# Patient Record
Sex: MALE | Race: WHITE | Employment: OTHER | URBAN - METROPOLITAN AREA
[De-identification: names, ages, dates, MRNs, and addresses within clinical notes are randomized per-mention and may not be internally consistent; named-entity substitution may affect disease eponyms.]

---

## 2017-01-03 ENCOUNTER — OFFICE VISIT (OUTPATIENT)
Dept: INTERNAL MEDICINE CLINIC | Age: 82
End: 2017-01-03

## 2017-01-03 VITALS
WEIGHT: 259 LBS | RESPIRATION RATE: 16 BRPM | DIASTOLIC BLOOD PRESSURE: 80 MMHG | BODY MASS INDEX: 36.26 KG/M2 | HEIGHT: 71 IN | HEART RATE: 74 BPM | SYSTOLIC BLOOD PRESSURE: 130 MMHG

## 2017-01-03 DIAGNOSIS — R31.9 HEMATURIA: Primary | ICD-10-CM

## 2017-01-03 PROCEDURE — 99214 OFFICE O/P EST MOD 30 MIN: CPT | Performed by: NURSE PRACTITIONER

## 2017-01-03 ASSESSMENT — ENCOUNTER SYMPTOMS
DIARRHEA: 0
VOMITING: 0
COUGH: 0
NAUSEA: 0
ABDOMINAL PAIN: 0
RHINORRHEA: 0
SHORTNESS OF BREATH: 0
WHEEZING: 0

## 2017-01-09 ENCOUNTER — HOSPITAL ENCOUNTER (OUTPATIENT)
Dept: CT IMAGING | Age: 82
Discharge: HOME | End: 2017-11-10
Attending: INTERNAL MEDICINE | Admitting: INTERNAL MEDICINE

## 2017-01-09 ENCOUNTER — HOSPITAL ENCOUNTER (OUTPATIENT)
Dept: CT IMAGING | Age: 82
Discharge: HOME | End: 2016-12-31
Attending: INTERNAL MEDICINE | Admitting: INTERNAL MEDICINE

## 2017-01-09 DIAGNOSIS — R31.0 GROSS HEMATURIA: ICD-10-CM

## 2017-01-26 ENCOUNTER — OFFICE VISIT (OUTPATIENT)
Dept: INTERNAL MEDICINE CLINIC | Age: 82
End: 2017-01-26

## 2017-01-26 VITALS
TEMPERATURE: 97.3 F | HEIGHT: 70 IN | DIASTOLIC BLOOD PRESSURE: 80 MMHG | WEIGHT: 258 LBS | HEART RATE: 76 BPM | BODY MASS INDEX: 36.94 KG/M2 | SYSTOLIC BLOOD PRESSURE: 130 MMHG

## 2017-01-26 DIAGNOSIS — Z23 NEED FOR VACCINATION WITH 13-POLYVALENT PNEUMOCOCCAL CONJUGATE VACCINE: ICD-10-CM

## 2017-01-26 DIAGNOSIS — J20.9 ACUTE BRONCHITIS, UNSPECIFIED ORGANISM: Primary | ICD-10-CM

## 2017-01-26 DIAGNOSIS — B36.9 FUNGAL OTITIS EXTERNA: ICD-10-CM

## 2017-01-26 DIAGNOSIS — H62.40 FUNGAL OTITIS EXTERNA: ICD-10-CM

## 2017-01-26 DIAGNOSIS — R31.29 MICROSCOPIC HEMATURIA: ICD-10-CM

## 2017-01-26 PROCEDURE — 99214 OFFICE O/P EST MOD 30 MIN: CPT | Performed by: INTERNAL MEDICINE

## 2017-01-26 PROCEDURE — 90670 PCV13 VACCINE IM: CPT | Performed by: INTERNAL MEDICINE

## 2017-01-26 PROCEDURE — G0009 ADMIN PNEUMOCOCCAL VACCINE: HCPCS | Performed by: INTERNAL MEDICINE

## 2017-01-26 RX ORDER — ACETIC ACID 20.65 MG/ML
4 SOLUTION AURICULAR (OTIC) 3 TIMES DAILY
Qty: 1 BOTTLE | Refills: 0 | Status: SHIPPED | OUTPATIENT
Start: 2017-01-26 | End: 2017-02-02

## 2017-01-26 RX ORDER — AZITHROMYCIN 250 MG/1
TABLET, FILM COATED ORAL
Qty: 1 PACKET | Refills: 0 | Status: SHIPPED | OUTPATIENT
Start: 2017-01-26 | End: 2017-02-05

## 2017-01-26 ASSESSMENT — PATIENT HEALTH QUESTIONNAIRE - PHQ9
1. LITTLE INTEREST OR PLEASURE IN DOING THINGS: 0
2. FEELING DOWN, DEPRESSED OR HOPELESS: 0
SUM OF ALL RESPONSES TO PHQ QUESTIONS 1-9: 0
SUM OF ALL RESPONSES TO PHQ9 QUESTIONS 1 & 2: 0

## 2017-01-26 ASSESSMENT — ENCOUNTER SYMPTOMS
WHEEZING: 0
COUGH: 1
SORE THROAT: 0
RHINORRHEA: 1
SHORTNESS OF BREATH: 0
HEMOPTYSIS: 0

## 2017-02-07 ENCOUNTER — OFFICE VISIT (OUTPATIENT)
Dept: ENT CLINIC | Age: 82
End: 2017-02-07

## 2017-02-07 VITALS
HEIGHT: 70 IN | TEMPERATURE: 97.9 F | SYSTOLIC BLOOD PRESSURE: 120 MMHG | BODY MASS INDEX: 36.84 KG/M2 | DIASTOLIC BLOOD PRESSURE: 74 MMHG | WEIGHT: 257.3 LBS

## 2017-02-07 DIAGNOSIS — H60.8X3 CHRONIC ECZEMATOUS OTITIS EXTERNA OF BOTH EARS: Primary | ICD-10-CM

## 2017-02-07 DIAGNOSIS — H61.23 CERUMINOSIS, BILATERAL: ICD-10-CM

## 2017-02-07 PROCEDURE — 99203 OFFICE O/P NEW LOW 30 MIN: CPT | Performed by: OTOLARYNGOLOGY

## 2017-02-07 RX ORDER — FLUOCINOLONE ACETONIDE 0.11 MG/ML
4 OIL AURICULAR (OTIC) DAILY
Qty: 1 BOTTLE | Refills: 0 | Status: SHIPPED | OUTPATIENT
Start: 2017-02-07 | End: 2017-05-12 | Stop reason: ALTCHOICE

## 2017-03-08 ENCOUNTER — OFFICE VISIT (OUTPATIENT)
Dept: INTERNAL MEDICINE CLINIC | Age: 82
End: 2017-03-08

## 2017-03-08 VITALS
SYSTOLIC BLOOD PRESSURE: 132 MMHG | HEIGHT: 70 IN | WEIGHT: 263 LBS | DIASTOLIC BLOOD PRESSURE: 80 MMHG | HEART RATE: 72 BPM | BODY MASS INDEX: 37.65 KG/M2

## 2017-03-08 DIAGNOSIS — L08.9 INFECTED SEBACEOUS CYST: Primary | ICD-10-CM

## 2017-03-08 DIAGNOSIS — L72.3 INFECTED SEBACEOUS CYST: Primary | ICD-10-CM

## 2017-03-08 PROCEDURE — 99213 OFFICE O/P EST LOW 20 MIN: CPT | Performed by: INTERNAL MEDICINE

## 2017-03-08 RX ORDER — CEPHALEXIN 500 MG/1
500 CAPSULE ORAL 3 TIMES DAILY
Qty: 21 CAPSULE | Refills: 0 | Status: SHIPPED | OUTPATIENT
Start: 2017-03-08 | End: 2017-05-12 | Stop reason: ALTCHOICE

## 2017-03-08 ASSESSMENT — ENCOUNTER SYMPTOMS
ABDOMINAL PAIN: 0
NAUSEA: 0
WHEEZING: 0
BLOOD IN STOOL: 0
VOMITING: 0
DIARRHEA: 0
CHEST TIGHTNESS: 0
COUGH: 0
SHORTNESS OF BREATH: 0

## 2017-04-26 RX ORDER — AZITHROMYCIN 250 MG/1
TABLET, FILM COATED ORAL
Qty: 1 PACKET | Refills: 0 | Status: SHIPPED | OUTPATIENT
Start: 2017-04-26 | End: 2017-05-12 | Stop reason: ALTCHOICE

## 2017-04-29 PROBLEM — I48.91 ATRIAL FIBRILLATION (HCC): Status: ACTIVE | Noted: 2017-04-29

## 2017-05-02 ENCOUNTER — CARE COORDINATION (OUTPATIENT)
Dept: CARE COORDINATION | Age: 82
End: 2017-05-02

## 2017-05-12 ENCOUNTER — OFFICE VISIT (OUTPATIENT)
Dept: CARDIOLOGY CLINIC | Age: 82
End: 2017-05-12

## 2017-05-12 VITALS
SYSTOLIC BLOOD PRESSURE: 112 MMHG | HEIGHT: 70 IN | HEART RATE: 57 BPM | WEIGHT: 256 LBS | DIASTOLIC BLOOD PRESSURE: 76 MMHG | BODY MASS INDEX: 36.65 KG/M2 | OXYGEN SATURATION: 97 %

## 2017-05-12 DIAGNOSIS — I48.91 ATRIAL FIBRILLATION, UNSPECIFIED TYPE (HCC): Primary | ICD-10-CM

## 2017-05-12 PROCEDURE — 99204 OFFICE O/P NEW MOD 45 MIN: CPT | Performed by: INTERNAL MEDICINE

## 2017-06-05 ENCOUNTER — OFFICE VISIT (OUTPATIENT)
Dept: INTERNAL MEDICINE CLINIC | Age: 82
End: 2017-06-05

## 2017-06-05 VITALS
WEIGHT: 259 LBS | BODY MASS INDEX: 36.26 KG/M2 | DIASTOLIC BLOOD PRESSURE: 78 MMHG | HEIGHT: 71 IN | HEART RATE: 72 BPM | SYSTOLIC BLOOD PRESSURE: 124 MMHG

## 2017-06-05 DIAGNOSIS — I48.0 PAROXYSMAL ATRIAL FIBRILLATION (HCC): Primary | ICD-10-CM

## 2017-06-05 DIAGNOSIS — J30.89 PERENNIAL ALLERGIC RHINITIS, UNSPECIFIED ALLERGIC RHINITIS TRIGGER: ICD-10-CM

## 2017-06-05 PROCEDURE — 99213 OFFICE O/P EST LOW 20 MIN: CPT | Performed by: INTERNAL MEDICINE

## 2017-06-05 RX ORDER — MECLIZINE HCL 12.5 MG/1
12.5 TABLET ORAL 3 TIMES DAILY PRN
Qty: 25 TABLET | Refills: 0 | Status: SHIPPED | OUTPATIENT
Start: 2017-06-05 | End: 2018-03-28

## 2017-06-05 ASSESSMENT — ENCOUNTER SYMPTOMS
SHORTNESS OF BREATH: 0
DIARRHEA: 0
WHEEZING: 0
COUGH: 0
CHEST TIGHTNESS: 0
NAUSEA: 0
ABDOMINAL PAIN: 0
BLOOD IN STOOL: 0
VOMITING: 0

## 2017-07-24 ENCOUNTER — OFFICE VISIT (OUTPATIENT)
Dept: INTERNAL MEDICINE CLINIC | Age: 82
End: 2017-07-24

## 2017-07-24 VITALS
BODY MASS INDEX: 37.51 KG/M2 | WEIGHT: 262 LBS | HEART RATE: 68 BPM | TEMPERATURE: 97.6 F | SYSTOLIC BLOOD PRESSURE: 122 MMHG | HEIGHT: 70 IN | DIASTOLIC BLOOD PRESSURE: 78 MMHG

## 2017-07-24 DIAGNOSIS — E55.9 VITAMIN D DEFICIENCY: ICD-10-CM

## 2017-07-24 DIAGNOSIS — R53.83 FATIGUE, UNSPECIFIED TYPE: ICD-10-CM

## 2017-07-24 DIAGNOSIS — J30.2 SEASONAL ALLERGIC RHINITIS, UNSPECIFIED ALLERGIC RHINITIS TRIGGER: ICD-10-CM

## 2017-07-24 DIAGNOSIS — R05.9 COUGH: Primary | ICD-10-CM

## 2017-07-24 DIAGNOSIS — R42 VERTIGO: ICD-10-CM

## 2017-07-24 PROCEDURE — 99213 OFFICE O/P EST LOW 20 MIN: CPT | Performed by: INTERNAL MEDICINE

## 2017-07-24 ASSESSMENT — ENCOUNTER SYMPTOMS
SHORTNESS OF BREATH: 0
NAUSEA: 0
BLOOD IN STOOL: 0
CHEST TIGHTNESS: 0
ABDOMINAL PAIN: 0
WHEEZING: 0
COUGH: 1
DIARRHEA: 0
VOMITING: 0

## 2017-10-18 ENCOUNTER — OFFICE VISIT (OUTPATIENT)
Dept: INTERNAL MEDICINE CLINIC | Age: 82
End: 2017-10-18

## 2017-10-18 VITALS
HEIGHT: 71 IN | DIASTOLIC BLOOD PRESSURE: 78 MMHG | TEMPERATURE: 97.8 F | HEART RATE: 76 BPM | SYSTOLIC BLOOD PRESSURE: 122 MMHG | BODY MASS INDEX: 36.4 KG/M2 | WEIGHT: 260 LBS

## 2017-10-18 DIAGNOSIS — Z23 NEED FOR INFLUENZA VACCINATION: ICD-10-CM

## 2017-10-18 DIAGNOSIS — J06.9 VIRAL URI: Primary | ICD-10-CM

## 2017-10-18 PROCEDURE — 99213 OFFICE O/P EST LOW 20 MIN: CPT | Performed by: INTERNAL MEDICINE

## 2017-10-18 PROCEDURE — 90662 IIV NO PRSV INCREASED AG IM: CPT | Performed by: INTERNAL MEDICINE

## 2017-10-18 PROCEDURE — G0008 ADMIN INFLUENZA VIRUS VAC: HCPCS | Performed by: INTERNAL MEDICINE

## 2017-10-18 RX ORDER — BETAMETHASONE DIPROPIONATE 0.5 MG/G
LOTION TOPICAL
Qty: 1 BOTTLE | Refills: 2 | Status: SHIPPED | OUTPATIENT
Start: 2017-10-18 | End: 2018-03-28

## 2017-10-18 ASSESSMENT — ENCOUNTER SYMPTOMS
SHORTNESS OF BREATH: 0
ABDOMINAL PAIN: 0
CHEST TIGHTNESS: 0
VOMITING: 0
COUGH: 1
RHINORRHEA: 1
DIARRHEA: 0
WHEEZING: 0
BLOOD IN STOOL: 0
NAUSEA: 0

## 2017-10-18 NOTE — PROGRESS NOTES
Subjective:      Patient ID: Vineet Saunders is a 80 y.o. male. HPI  C/o cough- few days. Non productive. Nasal congestion,pnd +. No sinus pressure. No fever. Review of Systems   Constitutional: Negative. Negative for fatigue and fever. HENT: Positive for postnasal drip and rhinorrhea. Respiratory: Positive for cough. Negative for chest tightness, shortness of breath and wheezing. Cardiovascular: Negative for chest pain and palpitations. Gastrointestinal: Negative for abdominal pain, blood in stool, diarrhea, nausea and vomiting. Objective:   Physical Exam   Constitutional: He appears well-developed and well-nourished. HENT:   Head: Normocephalic and atraumatic. Right Ear: External ear normal.   Left Ear: External ear normal.   Nose: Nose normal.   Mouth/Throat: Oropharynx is clear and moist. No oropharyngeal exudate. TM- normal.   Eyes: Pupils are equal, round, and reactive to light. Neck: Normal range of motion. Neck supple. Cardiovascular: Normal rate, regular rhythm, normal heart sounds and intact distal pulses. Exam reveals no gallop and no friction rub. No murmur heard. Pulmonary/Chest: Effort normal and breath sounds normal. No respiratory distress. He has no wheezes. He has no rales. He exhibits no tenderness. Lymphadenopathy:     He has no cervical adenopathy. Assessment:      1. Viral URI     2. Need for influenza vaccination             Plan:      Mucinex. Flonase.   claritin

## 2017-10-19 ENCOUNTER — TELEPHONE (OUTPATIENT)
Dept: INTERNAL MEDICINE CLINIC | Age: 82
End: 2017-10-19

## 2017-10-19 NOTE — TELEPHONE ENCOUNTER
----- Message from Christine Johnson MD sent at 10/19/2017  4:39 PM EDT -----  Contact: pt daughter in law # [phone]  Take tylenol for the fever. TAke mucinex.  ----- Message -----  From: Joana Cook  Sent: 10/19/2017   4:10 PM  To: Christine Johnson MD    Pt daughter in law called and said pt is running a fever today and has a bad wheeze.  Wanted to know if you could call pt in medication or if you would like to see him again    Last Visit: 10/18/17  Future Visit:----    Pharmacy: [facility] [address] [facility], [address] [place] [address] [place] [phone]

## 2017-12-08 ENCOUNTER — OFFICE VISIT (OUTPATIENT)
Dept: CARDIOLOGY CLINIC | Age: 82
End: 2017-12-08

## 2017-12-08 VITALS
OXYGEN SATURATION: 98 % | HEART RATE: 72 BPM | SYSTOLIC BLOOD PRESSURE: 110 MMHG | WEIGHT: 259 LBS | HEIGHT: 71 IN | DIASTOLIC BLOOD PRESSURE: 80 MMHG | BODY MASS INDEX: 36.26 KG/M2

## 2017-12-08 DIAGNOSIS — I48.0 PAROXYSMAL ATRIAL FIBRILLATION (HCC): Primary | ICD-10-CM

## 2017-12-08 PROCEDURE — 99213 OFFICE O/P EST LOW 20 MIN: CPT | Performed by: INTERNAL MEDICINE

## 2017-12-08 NOTE — LETTER
[facility] Cardiology - [address] [facility] [facility] [address] [address] [place] [address]  Phone: [phone]  Fax: [phone]    Ashlee Carrasquillo MD        2017     Wilda Sarmiento MD  [address], [facility] [place] [address]    Patient: Salinas Gamboa  MR Number: [id number]  YOB: 1927  Date of Visit: 2017    Dear Dr. Wilda Sarmiento:    Thank you for allowing me to participate in the care of Salinas Gamboa. Below are the relevant portions of my assessment and plan of care. [address] [facility]   Cardiovascular Evaluation    PATIENT: Salinas Gamboa  DATE: 2017  MRN: [id number]  CSN: [id number]  : 1927    Primary Care Doctor: Wilda Sarmiento MD    Reason for evaluation:   Atrial Fibrillation (c/o left arm pain) and Shortness of Breath (exertional)    Subjective:     History of present illness on initial date of evaluation:   Salinas Gamboa is a 80 y.o. patient who presents for hospital follow up. Today he reports he has been struggling with a lack of energy since the summer. He checks his blood pressure at home and it is stable. He has had no changes in his medications. He admits to occasional dizziness. His activity is limited due to his leg pain and fatigue. Patient Active Problem List   Diagnosis    Arthritis    Peptic ulcer disease    Total knee replacement status    Arrhythmia    Chest pain    Bradycardia    GERD (gastroesophageal reflux disease)    Contusion, hip    Primary osteoarthritis involving multiple joints    Atrial fibrillation [facility])       Past Medical History:   has a past medical history of Arthritis; Miller's esophagus; Cancer ([facility] [address] Diarrhea; GERD (gastroesophageal reflux disease);  Internal hemorrhoids without mention of complication; Osteoarthritis; Peptic ulcer disease; Peptic ulcer, unspecified site, unspecified as acute or chronic, without mention of hemorrhage or perforation; Reflux; Senile cataract; Sinusitis, acute; and Total knee replacement status. Surgical History:   has a past surgical history that includes joint replacement (2008); Tonsillectomy; ostate surgery; eye surgery; Total knee arthroplasty (2/1/2011); fracture surgery; Endoscopy, colon, diagnostic; Colonoscopy; Umbilical hernia repair (12/4/14); Inguinal hernia repair (Left, 12/4/14); and other surgical history (01/20/2016). Social History:   reports that he quit smoking about 42 years ago. He has a 40.00 pack-year smoking history. He quit smokeless tobacco use about 40 years ago. He reports that he drinks about 0.6 oz of alcohol per week . He reports that he does not use drugs. Family History:  No evidence for sudden cardiac death or premature CAD    Medications:  Reviewed and are listed in nursing record. and/or listed below  Outpatient Medications:  Prior to Admission medications    Medication Sig Start Date End Date Taking? Authorizing Provider   betamethasone dipropionate 0.05 % lotion Apply topically 2 times daily. 10/18/17  Yes Althea Jessica MD   meclizine (ANTIVERT) 12.5 MG tablet Take 1 tablet by mouth 3 times daily as needed for Dizziness 6/5/17  Yes Althea Jessica MD   loratadine (CLARITIN) 10 MG tablet Take 1 tablet by mouth daily 10/11/16  Yes Shakira Bonilla PA-C   fluticasone (FLONASE) 50 MCG/ACT nasal spray 2 sprays by Nasal route daily 8/25/16  Yes Althea Jessica MD   omeprazole (PRILOSEC) 20 MG capsule Take 1 capsule by mouth daily. 4/13/11  Yes Althea Jessica MD       In-patient schedule medications:        Infusion Medications: Allergies:  Review of patient's allergies indicates no known allergies. Review of Systems:   All 14 point review of symptoms completed. Pertinent positives identified in the HPI, all other review of symptoms negative as below. Review of Systems - History obtained from the patient  General ROS: negative for - chills, fever or night sweats  Psychological ROS: negative for - disorientation or hallucinations  Ophthalmic ROS: negative for - dry eyes, eye pain or loss of vision  ENT ROS: negative for - nasal discharge or sore throat  Allergy and Immunology ROS: negative for - hives or itchy/watery eyes  Hematological and Lymphatic ROS: negative for - jaundice or night sweats  Endocrine ROS: negative for - mood swings or temperature intolerance  Breast ROS: deferred  Respiratory ROS: negative for - hemoptysis or stridor  Cardiovascular ROS: negative for - chest pain, dyspnea on exertion or palpitations  Gastrointestinal ROS: no abdominal pain, change in bowel habits, or black or bloody stools  Genito-Urinary ROS: no dysuria, trouble voiding, or hematuria  Musculoskeletal ROS: negative for - gait disturbance, joint pain or joint stiffness  Neurological ROS: negative for - seizures or speech problems  Dermatological ROS: negative for - rash or skin lesion changes     Physical Examination:    Vitals:    12/08/17 1520   BP: 92/70   Pulse: 72   SpO2: 98%    Weight: 259 lb (117.5 kg)     Wt Readings from Last 3 Encounters:   12/08/17 259 lb (117.5 kg)   10/18/17 260 lb (117.9 kg)   07/24/17 262 lb (118.8 kg)     No intake or output data in the 24 hours ending 12/08/17 1542    General Appearance:  Alert, cooperative, no distress, appears stated age   Head:  Normocephalic, without obvious abnormality, atraumatic   Eyes:  PERRL, conjunctiva/corneas clear       Nose: Nares normal, no drainage or sinus tenderness   Throat: Lips, mucosa, and tongue normal   Neck: Supple, symmetrical, trachea midline, no adenopathy, thyroid: not enlarged, symmetric, no tenderness/mass/nodules, no carotid bruit or JVD       Lungs:   Clear to auscultation bilaterally, respirations unlabored   Chest Wall:  No tenderness or deformity

## 2017-12-08 NOTE — PATIENT INSTRUCTIONS
Plan:  1. Continue to check blood pressure. 2. Remain as active and independent as possible. 3. Follow up with me in 6 months.

## 2017-12-08 NOTE — PROGRESS NOTES
He quit smokeless tobacco use about 40 years ago. He reports that he drinks about 0.6 oz of alcohol per week . He reports that he does not use drugs. Family History:  No evidence for sudden cardiac death or premature CAD    Medications:  Reviewed and are listed in nursing record. and/or listed below  Outpatient Medications:  Prior to Admission medications    Medication Sig Start Date End Date Taking? Authorizing Provider   betamethasone dipropionate 0.05 % lotion Apply topically 2 times daily. 10/18/17  Yes Catie Correia MD   meclizine (ANTIVERT) 12.5 MG tablet Take 1 tablet by mouth 3 times daily as needed for Dizziness 6/5/17  Yes Catie Correia MD   loratadine (CLARITIN) 10 MG tablet Take 1 tablet by mouth daily 10/11/16  Yes Shakira Bonilla PA-C   fluticasone (FLONASE) 50 MCG/ACT nasal spray 2 sprays by Nasal route daily 8/25/16  Yes Catie Correia MD   omeprazole (PRILOSEC) 20 MG capsule Take 1 capsule by mouth daily. 4/13/11  Yes Catie Correia MD       In-patient schedule medications:        Infusion Medications: Allergies:  Review of patient's allergies indicates no known allergies. Review of Systems:   All 14 point review of symptoms completed. Pertinent positives identified in the HPI, all other review of symptoms negative as below.     Review of Systems - History obtained from the patient  General ROS: negative for - chills, fever or night sweats  Psychological ROS: negative for - disorientation or hallucinations  Ophthalmic ROS: negative for - dry eyes, eye pain or loss of vision  ENT ROS: negative for - nasal discharge or sore throat  Allergy and Immunology ROS: negative for - hives or itchy/watery eyes  Hematological and Lymphatic ROS: negative for - jaundice or night sweats  Endocrine ROS: negative for - mood swings or temperature intolerance  Breast ROS: deferred  Respiratory ROS: negative for - hemoptysis or stridor  Cardiovascular ROS:

## 2017-12-11 NOTE — COMMUNICATION BODY
[address] [facility]   Cardiovascular Evaluation    PATIENT: Alessandra Carranza  DATE: 2017  MRN: [id number]  CSN: [id number]  : 1927    Primary Care Doctor: Chapo Kamara MD    Reason for evaluation:   Atrial Fibrillation (c/o left arm pain) and Shortness of Breath (exertional)    Subjective:     History of present illness on initial date of evaluation:   Alessandra Carranza is a 80 y.o. patient who presents for hospital follow up. Today he reports he has been struggling with a lack of energy since the summer. He checks his blood pressure at home and it is stable. He has had no changes in his medications. He admits to occasional dizziness. His activity is limited due to his leg pain and fatigue. Patient Active Problem List   Diagnosis    Arthritis    Peptic ulcer disease    Total knee replacement status    Arrhythmia    Chest pain    Bradycardia    GERD (gastroesophageal reflux disease)    Contusion, hip    Primary osteoarthritis involving multiple joints    Atrial fibrillation [facility])       Past Medical History:   has a past medical history of Arthritis; Miller's esophagus; Cancer ([facility] [address] Diarrhea; GERD (gastroesophageal reflux disease); Internal hemorrhoids without mention of complication; Osteoarthritis; Peptic ulcer disease; Peptic ulcer, unspecified site, unspecified as acute or chronic, without mention of hemorrhage or perforation; Reflux; Senile cataract; Sinusitis, acute; and Total knee replacement status. Surgical History:   has a past surgical history that includes joint replacement (); Tonsillectomy; ostate surgery; eye surgery; Total knee arthroplasty (2011); fracture surgery; Endoscopy, colon, diagnostic; Colonoscopy; Umbilical hernia repair (14); Inguinal hernia repair (Left, 14); and other surgical history (2016). Social History:   reports that he quit smoking about 42 years ago. He has a 40.00 pack-year smoking history.

## 2018-03-28 ENCOUNTER — HOSPITAL ENCOUNTER (OUTPATIENT)
Dept: OTHER | Age: 83
Discharge: HOME | End: 2018-03-28
Attending: INTERNAL MEDICINE | Admitting: INTERNAL MEDICINE

## 2018-03-28 DIAGNOSIS — R29.6 RECURRENT FALLS: ICD-10-CM

## 2018-04-11 ENCOUNTER — TELEPHONE (OUTPATIENT)
Dept: INTERNAL MEDICINE CLINIC | Age: 83
End: 2018-04-11

## 2018-04-19 ENCOUNTER — OFFICE VISIT (OUTPATIENT)
Dept: INTERNAL MEDICINE CLINIC | Age: 83
End: 2018-04-19

## 2018-04-19 VITALS
SYSTOLIC BLOOD PRESSURE: 138 MMHG | HEIGHT: 70 IN | BODY MASS INDEX: 37.08 KG/M2 | HEART RATE: 80 BPM | WEIGHT: 259 LBS | DIASTOLIC BLOOD PRESSURE: 78 MMHG

## 2018-04-19 DIAGNOSIS — S32.050D CLOSED COMPRESSION FRACTURE OF L5 LUMBAR VERTEBRA WITH ROUTINE HEALING, SUBSEQUENT ENCOUNTER: Primary | ICD-10-CM

## 2018-04-19 DIAGNOSIS — I48.0 PAROXYSMAL ATRIAL FIBRILLATION (HCC): ICD-10-CM

## 2018-04-19 DIAGNOSIS — K59.00 CONSTIPATION, UNSPECIFIED CONSTIPATION TYPE: ICD-10-CM

## 2018-04-19 PROBLEM — S32.050A CLOSED COMPRESSION FRACTURE OF L5 LUMBAR VERTEBRA: Status: ACTIVE | Noted: 2018-04-19

## 2018-04-19 PROCEDURE — 99213 OFFICE O/P EST LOW 20 MIN: CPT | Performed by: INTERNAL MEDICINE

## 2018-04-19 RX ORDER — HYDROCODONE BITARTRATE AND ACETAMINOPHEN 5; 325 MG/1; MG/1
1 TABLET ORAL EVERY 6 HOURS PRN
COMMUNITY

## 2018-04-19 ASSESSMENT — ENCOUNTER SYMPTOMS
WHEEZING: 0
VOMITING: 0
BLOOD IN STOOL: 0
SHORTNESS OF BREATH: 0
NAUSEA: 0
ABDOMINAL PAIN: 0
CHEST TIGHTNESS: 0
DIARRHEA: 0
COUGH: 0

## 2018-05-11 ENCOUNTER — OFFICE VISIT (OUTPATIENT)
Dept: INTERNAL MEDICINE CLINIC | Age: 83
End: 2018-05-11

## 2018-05-11 VITALS
DIASTOLIC BLOOD PRESSURE: 78 MMHG | BODY MASS INDEX: 36.08 KG/M2 | HEIGHT: 70 IN | WEIGHT: 252 LBS | SYSTOLIC BLOOD PRESSURE: 122 MMHG | HEART RATE: 80 BPM

## 2018-05-11 DIAGNOSIS — H54.7 VISION PROBLEMS: ICD-10-CM

## 2018-05-11 DIAGNOSIS — S22.000A COMPRESSION FRACTURE OF BODY OF THORACIC VERTEBRA (HCC): Primary | ICD-10-CM

## 2018-05-11 PROCEDURE — 99213 OFFICE O/P EST LOW 20 MIN: CPT | Performed by: INTERNAL MEDICINE

## 2018-05-11 RX ORDER — BACLOFEN 10 MG/1
10 TABLET ORAL 3 TIMES DAILY
COMMUNITY
End: 2018-07-10 | Stop reason: ALTCHOICE

## 2018-05-11 ASSESSMENT — ENCOUNTER SYMPTOMS
WHEEZING: 0
ABDOMINAL PAIN: 0
BLOOD IN STOOL: 0
COUGH: 0
CHEST TIGHTNESS: 0
DIARRHEA: 0
VOMITING: 0
SHORTNESS OF BREATH: 0
NAUSEA: 0

## 2018-07-10 ENCOUNTER — OFFICE VISIT (OUTPATIENT)
Dept: CARDIOLOGY CLINIC | Age: 83
End: 2018-07-10

## 2018-07-10 VITALS
HEIGHT: 70 IN | HEART RATE: 80 BPM | BODY MASS INDEX: 35.36 KG/M2 | DIASTOLIC BLOOD PRESSURE: 84 MMHG | WEIGHT: 247 LBS | SYSTOLIC BLOOD PRESSURE: 120 MMHG | OXYGEN SATURATION: 97 %

## 2018-07-10 DIAGNOSIS — I48.0 PAROXYSMAL ATRIAL FIBRILLATION (HCC): Primary | ICD-10-CM

## 2018-07-10 PROCEDURE — 99214 OFFICE O/P EST MOD 30 MIN: CPT | Performed by: INTERNAL MEDICINE

## 2018-07-10 RX ORDER — SENNA PLUS 8.6 MG/1
1 TABLET ORAL DAILY
COMMUNITY

## 2018-07-10 RX ORDER — ERGOCALCIFEROL 1.25 MG/1
50000 CAPSULE ORAL WEEKLY
COMMUNITY
End: 2018-09-11 | Stop reason: ALTCHOICE

## 2018-07-10 RX ORDER — BACLOFEN 10 MG/1
10 TABLET ORAL NIGHTLY PRN
COMMUNITY

## 2018-07-10 NOTE — PROGRESS NOTES
[address] [facility]   Cardiovascular Evaluation    PATIENT: Rina Yen  DATE: 7/10/2018  MRN: [id number]  CSN: [id number]  : 1927    Primary Care Doctor: Ileana Rincon MD    Reason for evaluation:   Atrial Fibrillation and Chest Pain (occ. sharp)    Subjective:     History of present illness on initial date of evaluation:   Rina Yen is a 80 y.o. patient who presents for follow up. He reports he had an episode of left sided chest pain a few nights ago. He was hospitalized 3/2018 for a fall in which he injured his back. This has limited his activity due to pain. He main concern is his hearing. He and his wife live with his daughter. He denies shortness of breath or lightheadedness. He does complain of lower extremity edema. Patient Active Problem List   Diagnosis    Arthritis    Peptic ulcer disease    Total knee replacement status    Arrhythmia    Chest pain    Bradycardia    GERD (gastroesophageal reflux disease)    Contusion, hip    Primary osteoarthritis involving multiple joints    Paroxysmal atrial fibrillation (HCC)    Closed compression fracture of L5 lumbar vertebra (HCC)    Compression fracture of body of thoracic vertebra (HCC)       Past Medical History:   has a past medical history of Arthritis; Back injuries; Miller's esophagus; Cancer ([address] Diarrhea; GERD (gastroesophageal reflux disease); Internal hemorrhoids without mention of complication; Osteoarthritis; Peptic ulcer disease; Peptic ulcer, unspecified site, unspecified as acute or chronic, without mention of hemorrhage or perforation; Reflux; Senile cataract; Sinusitis, acute; and Total knee replacement status. Surgical History:   has a past surgical history that includes joint replacement (); Tonsillectomy; Prostate surgery; eye surgery; Total knee arthroplasty (2011); fracture surgery;  Endoscopy, colon, diagnostic; Colonoscopy; Umbilical hernia repair (14);

## 2018-07-10 NOTE — LETTER
[address] [place] [address] Cardiology - [address] [place] [address] Ester Carrillo. [address]  Phone: [phone]  Fax: [phone]    Debbie Dance, MD        July 10, 2018     [facility], [address] [place] [address], [address] [place] [address]    Patient: Jeannette Cooper  MR Number: [id number]  YOB: 1927  Date of Visit: 7/10/2018    Dear  [facility]:    Thank you for allowing me to participate in the care of Jeannette Cooper. Below are the relevant portions of my assessment and plan of care. History of present illness on initial date of evaluation:              Jeannette Cooper is a 80 y.o. patient who presents for follow up. He reports he had an episode of left sided chest pain a few nights ago. He was hospitalized 3/2018 for a fall in which he injured his back. This has limited his activity due to pain. He main concern is his hearing. He and his wife live with his daughter. He denies shortness of breath or lightheadedness. He does complain of lower extremity edema. Assessment:  80 y.o. patient with:  1. Paroxysmal Atrial Fibrillation              ~CHADVASC 2              ~no anticoagulation   2. Fatigue              ~new in the last 6 months              ~related to soft blood pressure? Plan:  1. I recommend that the patient continue their currently prescribed medications. Their drug modifiable risk factors appear to be well controlled. I will continue to address the need/dosing of medications in future visits. 2. Remain as active and independent as possible. 3. Follow up with me in 6 months. If you have questions, please do not hesitate to call me. I look forward to following Christal Edward along with you.     Sincerely,        Debbie Dance, MD

## 2018-07-24 ENCOUNTER — OFFICE VISIT (OUTPATIENT)
Dept: INTERNAL MEDICINE CLINIC | Age: 83
End: 2018-07-24

## 2018-07-24 VITALS
HEART RATE: 70 BPM | SYSTOLIC BLOOD PRESSURE: 135 MMHG | BODY MASS INDEX: 35.87 KG/M2 | DIASTOLIC BLOOD PRESSURE: 70 MMHG | RESPIRATION RATE: 18 BRPM | WEIGHT: 250 LBS

## 2018-07-24 DIAGNOSIS — R60.0 PEDAL EDEMA: ICD-10-CM

## 2018-07-24 DIAGNOSIS — L03.211 FACIAL CELLULITIS: Primary | ICD-10-CM

## 2018-07-24 PROCEDURE — 99213 OFFICE O/P EST LOW 20 MIN: CPT | Performed by: INTERNAL MEDICINE

## 2018-07-24 RX ORDER — CEPHALEXIN 500 MG/1
500 CAPSULE ORAL 3 TIMES DAILY
Qty: 21 CAPSULE | Refills: 0 | Status: SHIPPED | OUTPATIENT
Start: 2018-07-24 | End: 2018-07-31

## 2018-07-24 ASSESSMENT — ENCOUNTER SYMPTOMS
WHEEZING: 0
BLOOD IN STOOL: 0
COUGH: 0
CHEST TIGHTNESS: 0
SHORTNESS OF BREATH: 0
DIARRHEA: 0
VOMITING: 0
ABDOMINAL PAIN: 0
NAUSEA: 0

## 2018-08-03 ENCOUNTER — TELEPHONE (OUTPATIENT)
Dept: CARDIOLOGY CLINIC | Age: 83
End: 2018-08-03

## 2018-08-03 DIAGNOSIS — I48.0 PAROXYSMAL ATRIAL FIBRILLATION (HCC): Primary | ICD-10-CM

## 2018-08-03 DIAGNOSIS — I50.40 COMBINED CONGESTIVE SYSTOLIC AND DIASTOLIC HEART FAILURE (HCC): ICD-10-CM

## 2018-08-03 DIAGNOSIS — R60.1 GENERALIZED EDEMA: ICD-10-CM

## 2018-08-03 NOTE — TELEPHONE ENCOUNTER
Notified [facility]. Labs and echo ordered. appt made with [facility] for 8/6/18. Scheduling number given.

## 2018-08-03 NOTE — TELEPHONE ENCOUNTER
Annmarie,pt's contact, has called stating pt has been dealing with a lot of swelling and has +4 pitting edema. Pt's PCP and Nephrologist requested for Patric Alpers to call the office to get [facility]'s response regarding starting pt on a water pill, or if he prefers to see him in office first. If a medication is called in please send to [address] [place] [address]. Please advise and contact Patric Alpers no matter what at [phone]. [facility]'s next available opening is 9-13-18, so if he wants to see pt we would need an overbook approved.

## 2018-08-08 ENCOUNTER — OFFICE VISIT (OUTPATIENT)
Dept: CARDIOLOGY CLINIC | Age: 83
End: 2018-08-08

## 2018-08-08 VITALS
WEIGHT: 246.8 LBS | OXYGEN SATURATION: 93 % | DIASTOLIC BLOOD PRESSURE: 64 MMHG | HEIGHT: 70 IN | SYSTOLIC BLOOD PRESSURE: 104 MMHG | BODY MASS INDEX: 35.33 KG/M2 | HEART RATE: 67 BPM

## 2018-08-08 DIAGNOSIS — I48.19 PERSISTENT ATRIAL FIBRILLATION (HCC): ICD-10-CM

## 2018-08-08 DIAGNOSIS — R60.1 GENERALIZED EDEMA: Primary | ICD-10-CM

## 2018-08-08 DIAGNOSIS — D64.9 ANEMIA, UNSPECIFIED TYPE: ICD-10-CM

## 2018-08-08 DIAGNOSIS — R53.83 FATIGUE, UNSPECIFIED TYPE: ICD-10-CM

## 2018-08-08 PROCEDURE — 99214 OFFICE O/P EST MOD 30 MIN: CPT | Performed by: NURSE PRACTITIONER

## 2018-08-08 RX ORDER — NITROGLYCERIN 0.4 MG/1
0.4 TABLET SUBLINGUAL EVERY 5 MIN PRN
COMMUNITY

## 2018-08-08 RX ORDER — FUROSEMIDE 20 MG/1
20 TABLET ORAL
COMMUNITY
End: 2018-08-08 | Stop reason: SDUPTHER

## 2018-08-08 RX ORDER — POLYETHYLENE GLYCOL 3350 17 G/17G
17 POWDER, FOR SOLUTION ORAL DAILY
COMMUNITY

## 2018-08-08 RX ORDER — FUROSEMIDE 20 MG/1
20 TABLET ORAL SEE ADMIN INSTRUCTIONS
Qty: 36 TABLET | Refills: 1 | Status: SHIPPED | OUTPATIENT
Start: 2018-08-08

## 2018-08-08 RX ORDER — DOCUSATE SODIUM 100 MG/1
100 CAPSULE, LIQUID FILLED ORAL DAILY
COMMUNITY

## 2018-08-08 NOTE — LETTER
· Respiratory: No cough or wheezing, no sputum production. · Gastrointestinal: No abdominal pain, + constipation or diarrhea  · Genitourinary: No dysuria, trouble voiding, or hematuria. · Musculoskeletal:  No weakness or joint complaints. · Integumentary: No rash or pruritis. · Neurological: No numbness or tingling. No weakness. No tremor. · Psychiatric: + anxiety, no depression. · Endocrine:  No excessive thirst or urination. · Hematologic/Lymphatic: No abnormal bruising or bleeding, blood clots or swollen lymph nodes. Physical Examination:    Vitals:    08/08/18 1106   BP: 104/64   Pulse: 67   SpO2: 93%   Weight: 246 lb 12.8 oz (111.9 kg)   Height: 5' 10\" (1.778 m)        Constitutional and General Appearance: no apparent distress, appears younger than stated age, [place]  HEENT: non-icteric sclera, oropharynx without exudate, oral mucosa moist  Neck: JVP less than 8 cm H20  Respiratory:  · No use of accessory muscles  · Clear breath sounds throughout, no wheezing, no crackles, no rhonchi  Cardiovascular:  · The apical impulses not displaced  ·  no murmur/rub/gallop  · Regular rate and rhythm, S1,S2 normal  · Radial pulses 2+ and equal bilaterally  · ++ BLE edema to the mid shin, no redness or pain.    · Pedal Pulses: 1+ and equal   Abdomen:  · No masses or tenderness  · Liver: No Abnormalities Noted  Musculoskeletal/Skin:  · Walks with cane  · There is no clubbing, cyanosis of the extremities  · Skin is warm and dry  · Moves all extremities well  Neurological/Psychiatric:  · Alert and oriented in all spheres  · No abnormalities of mood, affect, memory, mentation, or behavior are noted    Lab Data:  CBC:   Lab Results   Component Value Date    WBC 10.1 03/28/2018    WBC 7.0 04/29/2017    WBC 5.3 05/23/2016    RBC 4.89 03/28/2018    RBC 4.73 04/29/2017    RBC 5.17 05/23/2016    HGB 14.3 03/28/2018    HGB 13.9 04/29/2017    HGB 14.8 05/23/2016    HCT 42.1 03/28/2018    HCT 41.8 04/29/2017 HCT 44.8 05/23/2016    MCV 86.2 03/28/2018    MCV 88.5 04/29/2017    MCV 86.7 05/23/2016    RDW 13.1 03/28/2018    RDW 13.6 04/29/2017    RDW 13.9 05/23/2016     03/28/2018     04/29/2017     05/23/2016     Iron: No results found for: IRON, TIBC, FERRITIN  BMP:   Lab Results   Component Value Date     03/28/2018     04/29/2017     05/23/2016    K 5.1 03/28/2018    K 4.6 04/29/2017    K 4.1 05/23/2016    CL 95 03/28/2018     04/29/2017     05/23/2016    CO2 26 03/28/2018    CO2 29 04/29/2017    CO2 25 05/23/2016    PHOS 2.8 02/01/2011    BUN 17 03/28/2018    BUN 18 04/29/2017    BUN 13 05/23/2016    CREATININE 0.9 03/28/2018    CREATININE 1.1 04/29/2017    CREATININE 1.0 05/23/2016     BNP:   Lab Results   Component Value Date    PROBNP 1,877 04/29/2017     Lipids:   Lab Results   Component Value Date    CHOL 148 09/20/2011        Lab Results   Component Value Date    TRIG 44 09/20/2011        Lab Results   Component Value Date    HDL 45 09/20/2011        Lab Results   Component Value Date    LDLCALC 95 09/20/2011        Lab Results   Component Value Date    LABVLDL 9 09/20/2011      No results found for: CHOLHDLRATIO    EF: No results found for: LVEF, LVEFMODE    Recent Testing:  Echo 1/14/16  Summary   Normal left ventricle size and systolic function with an estimated ejection   fraction of 55%. No regional wall motion abnormalities are seen.  Lauren Canavan is mild concentric left ventricular hypertrophy.   Diastolic filling parameters suggest grade I diastolic dysfunction.   The aortic root is mildly dilated.   The right atrium is mildly dilated.   Aortic valve appears tricuspid and mildly sclerotic but opens adequately.   There is mild tricuspid regurgitation.   Systolic pulmonary artery pressure (SPAP) is normal and estimated at 37 mmHg   (RA pressure 8 mmHg).     Pertinent Problems:  · Bilateral lower extremity Edema- likely due to chronic diastolic heart failure · Atrial fibrillation not on anticoagulation due to falling  · Fatigue  · Chronic back pain  · Hx of Prostate CA  · Hx of Miller's Esophagus     Visit Diagnosis:    1. Generalized edema    2. Persistent atrial fibrillation ([address]    3. Fatigue, unspecified type    4. Anemia, unspecified type      Plan:   1. Continue lasix 20mg every M,W,F  2. Recheck labs BMP in 1-2 week  3. Check Iron labs  4. Echocardiogram- as ordered; order given to daughter to call to schedule through the [address] per patient/family request  5. Elevate your legs when possible  6. Stay as active as possible  7. Compression socks- apply during the day and remove at night  8. Follow up 3 weeks      QUALITY MEASURES  1. Tobacco Cessation Counseling: NA  2. Retake of BP if >140/90:   NA  3. Documentation to PCP/referring for new patient:  Sent to PCP at close of office visit  4. CAD patient on anti-platelet: NA  5. CAD patient on STATIN therapy:  NA  6. Patient with CHF and aFib on anticoagulation:  No, due to falls     I appreciate the opportunity for caring for this patient. Tiffany Hook CNP, 8/8/2018, 1:10 PM    If you have questions, please do not hesitate to call me. I look forward to following Anish Chopra along with you.     Sincerely,        JIN Jean - MANNY

## 2018-08-08 NOTE — PROGRESS NOTES
[facility]   Cardiac Follow-up    Primary Care Doctor:  Ileana Rincon MD    Chief Complaint   Patient presents with    Edema     echo is scheduled for 8/14    Shortness of Breath        History of Present Illness:   I had the pleasure of seeing Rina Yen in follow up for edema. Follows with Dr. Krysten Gonzalez. Since his last visit, he is having a lot of swelling in the legs, 4+ pitting edema per the daughter. He lives with her and his wife. Started with the edema a few weeks ago. Was seen by [facility] and he was started on lasix 20mg Three times a week. Since starting the lasix, edema has improved from 4+ to 2+ BLE edema. Has had good response with the lasix. No weeping or sores. Denies much shortness of breath, but does have a lot of fatigue. Helps take care of his 80year old wife as well. Home weight 245-247lbs. He used to weigh 265lbs back in March. Not on potassium pill, he does eat bananas, tomatoes, etc.     Rina Yen describes symptoms including fatigue, edema but denies chest pain, palpitations, orthopnea, early saiety, syncope. NYHA:   II  ACC/ AHA Stage:    C    Past Medical History:   has a past medical history of Arthritis; Back injuries; Miller's esophagus; Cancer ([address] Diarrhea; GERD (gastroesophageal reflux disease); Internal hemorrhoids without mention of complication; Osteoarthritis; Peptic ulcer disease; Peptic ulcer, unspecified site, unspecified as acute or chronic, without mention of hemorrhage or perforation; Reflux; Senile cataract; Sinusitis, acute; and Total knee replacement status. Surgical History:   has a past surgical history that includes joint replacement (2008); Tonsillectomy; Prostate surgery; eye surgery; Total knee arthroplasty (2/1/2011); fracture surgery; Endoscopy, colon, diagnostic; Colonoscopy; Umbilical hernia repair (12/4/14); Inguinal hernia repair (Left, 12/4/14); and other surgical history (01/20/2016).    Social History:   reports that he quit smoking about 42 years ago. He has a 40.00 pack-year smoking history. He quit smokeless tobacco use about 40 years ago. He reports that he drinks about 8.4 oz of alcohol per week . He reports that he does not use drugs. Family History:   Family History   Problem Relation Age of Onset    Heart Disease Neg Hx        Home Medications:  Prior to Admission medications    Medication Sig Start Date End Date Taking? Authorizing Provider   vitamin D (ERGOCALCIFEROL) 98619 units CAPS capsule Take 50,000 Units by mouth once a week    Historical Provider, MD   senna (SENOKOT) 8.6 MG tablet Take 1 tablet by mouth daily    Historical Provider, MD   baclofen (LIORESAL) 10 MG tablet Take 10 mg by mouth nightly as needed    Historical Provider, MD   HYDROcodone-acetaminophen (NORCO) 5-325 MG per tablet Take 1 tablet by mouth every 6 hours as needed for Pain. Aniyaha Chiki Historical Provider, MD   loratadine (CLARITIN) 10 MG tablet Take 1 tablet by mouth daily 10/11/16   Dov Bonilla PA-C   fluticasone (FLONASE) 50 MCG/ACT nasal spray 2 sprays by Nasal route daily 8/25/16   Temo Dodson MD   omeprazole (PRILOSEC) 20 MG capsule Take 1 capsule by mouth daily. 4/13/11   Temo Dodson MD        Allergies:  Ciprofloxacin     Review of Systems:   · Constitutional: there has been no unanticipated weight loss. · Eyes: No vision changes  · ENT: No Headaches, no nasal congestion. No mouth sores or sore throat. · Cardiovascular: Reviewed in HPI  · Respiratory: No cough or wheezing, no sputum production. · Gastrointestinal: No abdominal pain, + constipation or diarrhea  · Genitourinary: No dysuria, trouble voiding, or hematuria. · Musculoskeletal:  No weakness or joint complaints. · Integumentary: No rash or pruritis. · Neurological: No numbness or tingling. No weakness. No tremor. · Psychiatric: + anxiety, no depression.   · Endocrine:  No excessive thirst or to call to schedule through the [place] per patient/family request  5. Elevate your legs when possible  6. Stay as active as possible  7. Compression socks- apply during the day and remove at night  8. Follow up 3 weeks      QUALITY MEASURES  1. Tobacco Cessation Counseling: NA  2. Retake of BP if >140/90:   NA  3. Documentation to PCP/referring for new patient:  Sent to PCP at close of office visit  4. CAD patient on anti-platelet: NA  5. CAD patient on STATIN therapy:  NA  6. Patient with CHF and aFib on anticoagulation:  No, due to falls     I appreciate the opportunity for caring for this patient.      Abhishek Burrell CNP, 8/8/2018, 1:10 PM

## 2018-08-09 NOTE — COMMUNICATION BODY
to call to schedule through the [facility] per patient/family request  5. Elevate your legs when possible  6. Stay as active as possible  7. Compression socks- apply during the day and remove at night  8. Follow up 3 weeks      QUALITY MEASURES  1. Tobacco Cessation Counseling: NA  2. Retake of BP if >140/90:   NA  3. Documentation to PCP/referring for new patient:  Sent to PCP at close of office visit  4. CAD patient on anti-platelet: NA  5. CAD patient on STATIN therapy:  NA  6. Patient with CHF and aFib on anticoagulation:  No, due to falls     I appreciate the opportunity for caring for this patient.      Adrian Ramirez CNP, 8/8/2018, 1:10 PM

## 2018-08-24 ENCOUNTER — TELEPHONE (OUTPATIENT)
Dept: CARDIOLOGY | Age: 83
End: 2018-08-24

## 2018-08-24 NOTE — TELEPHONE ENCOUNTER
I called and spoke with patient's wife. She is unsure of patient has had lab work done and will have her daughter call us back to discuss this.

## 2018-08-27 ENCOUNTER — TELEPHONE (OUTPATIENT)
Dept: CARDIOLOGY CLINIC | Age: 83
End: 2018-08-27

## 2018-08-27 NOTE — TELEPHONE ENCOUNTER
I spoke with daughter, she gave [facility] # [phone]  I faxed a request to [facility] for labs.

## 2018-08-27 NOTE — TELEPHONE ENCOUNTER
Pt's dtr sts patient had labs drawn at [place]. dtr rec'd results on 8/22. dtr also sts they did what [facility] wanted drawn.

## 2018-08-29 ENCOUNTER — TELEPHONE (OUTPATIENT)
Dept: INTERNAL MEDICINE CLINIC | Age: 83
End: 2018-08-29

## 2018-08-29 RX ORDER — CEPHALEXIN 500 MG/1
500 CAPSULE ORAL 3 TIMES DAILY
Qty: 21 CAPSULE | Refills: 0 | Status: SHIPPED | OUTPATIENT
Start: 2018-08-29 | End: 2018-09-06

## 2018-08-29 NOTE — TELEPHONE ENCOUNTER
----- Message from Dottie Li MD sent at 8/29/2018  1:01 PM EDT -----  Contact: Daughter [phone]  Keflex 500 tid #[id number]   ----- Message -----  From: Jhonatan Boucher  Sent: 8/29/2018  12:34 PM  To: Dottie Li MD    Patient's daughter called stating that about a month ago he was in to see one of the doctors for a staph infection on his face and was given an antibiotic. She noticed yesterday near his ear a red spot and now today it is swelling and puffy. She wanted to know if he can get an antibiotic again? Patient uses [id number] Jocelyn Rees.

## 2018-08-29 NOTE — TELEPHONE ENCOUNTER
Labs reviewed. Potassium remains normal.   No changes to medication since last visit. Keep follow up.

## 2018-09-06 ENCOUNTER — OFFICE VISIT (OUTPATIENT)
Dept: INTERNAL MEDICINE CLINIC | Age: 83
End: 2018-09-06

## 2018-09-06 VITALS
HEART RATE: 76 BPM | DIASTOLIC BLOOD PRESSURE: 78 MMHG | SYSTOLIC BLOOD PRESSURE: 108 MMHG | WEIGHT: 246 LBS | BODY MASS INDEX: 35.22 KG/M2 | HEIGHT: 70 IN

## 2018-09-06 DIAGNOSIS — R19.8 ABNORMAL BOWEL MOVEMENT: Primary | ICD-10-CM

## 2018-09-06 PROCEDURE — 99213 OFFICE O/P EST LOW 20 MIN: CPT | Performed by: INTERNAL MEDICINE

## 2018-09-06 ASSESSMENT — ENCOUNTER SYMPTOMS
COUGH: 0
WHEEZING: 0
NAUSEA: 0
BLOOD IN STOOL: 0
SHORTNESS OF BREATH: 0
ABDOMINAL PAIN: 0
DIARRHEA: 0
CHEST TIGHTNESS: 0
VOMITING: 0

## 2018-09-06 NOTE — PROGRESS NOTES
Subjective:      Patient ID: Luis E Bell is a 80 y.o. male. HPI  Having problems with BMs. Has occasional constipation. Uses Miralax daily along with Senna with good results. Concerned shay the is not having solid BMs  No blood in stools. No abd pain. Recent CBC at [facility] normal.      Review of Systems   Constitutional: Negative. Negative for fatigue and fever. Respiratory: Negative for cough, chest tightness, shortness of breath and wheezing. Cardiovascular: Negative for chest pain and palpitations. Gastrointestinal: Negative for abdominal pain, blood in stool, diarrhea, nausea and vomiting. Objective:   Physical Exam   Constitutional: He is oriented to person, place, and time. He appears well-developed and well-nourished. HENT:   Head: Normocephalic and atraumatic. Eyes: Pupils are equal, round, and reactive to light. Neck: Normal range of motion. Neck supple. No thyromegaly present. Cardiovascular: Normal rate, regular rhythm and normal heart sounds. Exam reveals no gallop and no friction rub. No murmur heard. No carotid bruit   Pulmonary/Chest: Effort normal and breath sounds normal. No respiratory distress. He has no wheezes. He has no rales. He exhibits no tenderness. Abdominal: Soft. Bowel sounds are normal. He exhibits no distension and no mass. There is no tenderness. There is no rebound and no guarding. Musculoskeletal: He exhibits no edema. Neurological: He is alert and oriented to person, place, and time. Assessment:       Diagnosis Orders   1. Abnormal bowel movement             Plan:      Advised to stop the Miralax and see if the bowel habits go back to normal.  No need for further testing now.         Justin Che MD

## 2018-09-11 ENCOUNTER — OFFICE VISIT (OUTPATIENT)
Dept: CARDIOLOGY CLINIC | Age: 83
End: 2018-09-11

## 2018-09-11 VITALS
HEART RATE: 61 BPM | DIASTOLIC BLOOD PRESSURE: 70 MMHG | WEIGHT: 244.4 LBS | SYSTOLIC BLOOD PRESSURE: 104 MMHG | BODY MASS INDEX: 37.04 KG/M2 | OXYGEN SATURATION: 97 % | HEIGHT: 68 IN

## 2018-09-11 DIAGNOSIS — I50.32 CHRONIC DIASTOLIC HEART FAILURE (HCC): Primary | ICD-10-CM

## 2018-09-11 DIAGNOSIS — R60.1 GENERALIZED EDEMA: ICD-10-CM

## 2018-09-11 DIAGNOSIS — I48.19 PERSISTENT ATRIAL FIBRILLATION (HCC): ICD-10-CM

## 2018-09-11 PROCEDURE — 99213 OFFICE O/P EST LOW 20 MIN: CPT | Performed by: NURSE PRACTITIONER

## 2018-09-11 NOTE — PROGRESS NOTES
throat. · Cardiovascular: Reviewed in HPI  · Respiratory: No cough or wheezing, no sputum production. · Gastrointestinal: No abdominal pain, + constipation or diarrhea  · Genitourinary: No dysuria, trouble voiding, or hematuria. · Musculoskeletal:  No weakness or joint complaints. · Integumentary: No rash or pruritis. · Neurological: No numbness or tingling. No weakness. No tremor. · Psychiatric: + anxiety, no depression. · Endocrine:  No excessive thirst or urination. · Hematologic/Lymphatic: No abnormal bruising or bleeding, blood clots or swollen lymph nodes.     Physical Examination:    Vitals:    09/11/18 1131   BP: 104/70   Pulse: 61   SpO2: 97%   Weight: 244 lb 6.4 oz (110.9 kg)   Height: 5' 8\" (1.727 m)        Constitutional and General Appearance: no apparent distress, appears younger than stated age, [place]  HEENT: non-icteric sclera, oropharynx without exudate, oral mucosa moist  Neck: JVP less than 8 cm H20  Respiratory:  · No use of accessory muscles  · Clear breath sounds throughout, no wheezing, no crackles, no rhonchi  Cardiovascular:  · The apical impulses not displaced  ·  no murmur/rub/gallop  · Regular rate and rhythm, S1,S2 normal  · Radial pulses 2+ and equal bilaterally  · + BLE edema   · Pedal Pulses: 1+ and equal   Abdomen:  · No masses or tenderness  · Liver: No Abnormalities Noted  Musculoskeletal/Skin:  · Walks with cane  · There is no clubbing, cyanosis of the extremities  · Skin is warm and dry  · Moves all extremities well  Neurological/Psychiatric:  · Alert and oriented in all spheres  · No abnormalities of mood, affect, memory, mentation, or behavior are noted    Lab Data:  CBC:   Lab Results   Component Value Date    WBC 10.1 03/28/2018    WBC 7.0 04/29/2017    WBC 5.3 05/23/2016    RBC 4.89 03/28/2018    RBC 4.73 04/29/2017    RBC 5.17 05/23/2016    HGB 14.3 03/28/2018    HGB 13.9 04/29/2017    HGB 14.8 05/23/2016    HCT 42.1 03/28/2018    HCT 41.8 04/29/2017    HCT 44.8 05/23/2016    MCV 86.2 03/28/2018    MCV 88.5 04/29/2017    MCV 86.7 05/23/2016    RDW 13.1 03/28/2018    RDW 13.6 04/29/2017    RDW 13.9 05/23/2016     03/28/2018     04/29/2017     05/23/2016     Iron: No results found for: IRON, TIBC, FERRITIN  BMP:   Lab Results   Component Value Date     03/28/2018     04/29/2017     05/23/2016    K 5.1 03/28/2018    K 4.6 04/29/2017    K 4.1 05/23/2016    CL 95 03/28/2018     04/29/2017     05/23/2016    CO2 26 03/28/2018    CO2 29 04/29/2017    CO2 25 05/23/2016    PHOS 2.8 02/01/2011    BUN 17 03/28/2018    BUN 18 04/29/2017    BUN 13 05/23/2016    CREATININE 0.9 03/28/2018    CREATININE 1.1 04/29/2017    CREATININE 1.0 05/23/2016     BNP:   Lab Results   Component Value Date    PROBNP 1,877 04/29/2017     Lipids:   Lab Results   Component Value Date    CHOL 148 09/20/2011        Lab Results   Component Value Date    TRIG 44 09/20/2011        Lab Results   Component Value Date    HDL 45 09/20/2011        Lab Results   Component Value Date    LDLCALC 95 09/20/2011        Lab Results   Component Value Date    LABVLDL 9 09/20/2011      No results found for: CHOLHDLRATIO    EF: No results found for: LVEF, LVEFMODE    Recent Testing:  Echo 1/14/16  Summary   Normal left ventricle size and systolic function with an estimated ejection   fraction of 55%. No regional wall motion abnormalities are seen.  Davidson Pettigrew is mild concentric left ventricular hypertrophy.   Diastolic filling parameters suggest grade I diastolic dysfunction.   The aortic root is mildly dilated.   The right atrium is mildly dilated.   Aortic valve appears tricuspid and mildly sclerotic but opens adequately.   There is mild tricuspid regurgitation.   Systolic pulmonary artery pressure (SPAP) is normal and estimated at 37 mmHg   (RA pressure 8 mmHg).     Pertinent Problems:  · Bilateral lower extremity Edema- Improved  · chronic diastolic heart failure  · Atrial fibrillation not on anticoagulation due to falling  · Fatigue  · Chronic back pain  · Hx of Prostate CA  · Hx of Miller's Esophagus     Visit Diagnosis:    1. Generalized edema    2. Chronic diastolic heart failure (HCC)    3. Persistent atrial fibrillation ([address]      Plan:   1. Continue lasix 20mg every M,W,F  2. Check weights daily, and record once you get your scale. Once you have your weights, email the list to me to help track fluid balance. 3. If you gain 3lbs in a day or 5lbs in a week- okay to take an extra water pill (lasix)  4. Recheck BMP in 4 weeks  5. Elevate your legs when possible  6. Stay as active as possible  7. Follow up 3 months or sooner if needed      QUALITY MEASURES  1. Tobacco Cessation Counseling: NA  2. Retake of BP if >140/90:   NA  3. Documentation to PCP/referring for new patient:  Sent to PCP at close of office visit  4. CAD patient on anti-platelet: NA  5. CAD patient on STATIN therapy:  NA  6. Patient with CHF and aFib on anticoagulation:  No, due to falls     I appreciate the opportunity for caring for this patient.      Benjaman Fabry, CNP, 9/11/2018, 11:38 AM

## 2018-09-12 PROBLEM — I48.19 PERSISTENT ATRIAL FIBRILLATION (HCC): Status: ACTIVE | Noted: 2018-09-12

## 2018-09-12 PROBLEM — I50.32 CHRONIC DIASTOLIC HEART FAILURE (HCC): Status: ACTIVE | Noted: 2018-09-12

## 2018-09-12 PROBLEM — R60.1 GENERALIZED EDEMA: Status: ACTIVE | Noted: 2018-09-12

## 2018-12-12 ENCOUNTER — OFFICE VISIT (OUTPATIENT)
Dept: CARDIOLOGY CLINIC | Age: 83
End: 2018-12-12
Payer: COMMERCIAL

## 2018-12-12 VITALS
SYSTOLIC BLOOD PRESSURE: 120 MMHG | HEART RATE: 56 BPM | DIASTOLIC BLOOD PRESSURE: 80 MMHG | HEIGHT: 68 IN | BODY MASS INDEX: 37.16 KG/M2 | OXYGEN SATURATION: 98 % | WEIGHT: 245.2 LBS

## 2018-12-12 DIAGNOSIS — R60.1 GENERALIZED EDEMA: Primary | ICD-10-CM

## 2018-12-12 DIAGNOSIS — I48.19 PERSISTENT ATRIAL FIBRILLATION (HCC): ICD-10-CM

## 2018-12-12 DIAGNOSIS — I50.32 CHRONIC DIASTOLIC HEART FAILURE (HCC): ICD-10-CM

## 2018-12-12 PROCEDURE — 99214 OFFICE O/P EST MOD 30 MIN: CPT | Performed by: NURSE PRACTITIONER

## 2018-12-12 RX ORDER — ACETAMINOPHEN 500 MG
500 TABLET ORAL EVERY 6 HOURS PRN
COMMUNITY

## 2018-12-12 RX ORDER — MEDICAL SUPPLY, MISCELLANEOUS
EACH MISCELLANEOUS
COMMUNITY

## 2018-12-12 RX ORDER — BETAMETHASONE DIPROPIONATE 0.05 %
OINTMENT (GRAM) TOPICAL 2 TIMES DAILY
COMMUNITY

## 2018-12-12 RX ORDER — LIDOCAINE 50 MG/G
1 PATCH TOPICAL DAILY
COMMUNITY

## 2018-12-12 NOTE — PROGRESS NOTES
[facility]   Cardiac Follow-up    Primary Care Doctor:  Solomon Srinivasan MD    Chief Complaint   Patient presents with    Follow-up        History of Present Illness:   I had the pleasure of seeing Jose Roper in follow up for edema. Follows with Dr. Karol Pineda. 4+ pitting edema per the daughter prior to last visit. He lives with her and his wife. Was seen by [facility] and he was started on lasix 20mg Three times a week. Since last visit, lasix was continued at 20mg three times a week. Here with daughter. Edema is stable. Continues with a lot of back pain and shoulder pain. He has no endurance. Does have a cough/cold for the last 1 week; has been taking Nyquil. Feels like he is getting better. Checking daily weights 239-245lbs. He got a scale from the [address] He is wearing compression socks which have been helpful. Jose Roper describes symptoms including edema but denies chest pain, palpitations, orthopnea, early saiety, syncope. NYHA:   II  ACC/ AHA Stage:    C    Past Medical History:   has a past medical history of Arthritis; Back injuries; Miller's esophagus; Cancer ([address] Diarrhea; GERD (gastroesophageal reflux disease); Internal hemorrhoids without mention of complication; Osteoarthritis; Peptic ulcer disease; Peptic ulcer, unspecified site, unspecified as acute or chronic, without mention of hemorrhage or perforation; Reflux; Senile cataract; Sinusitis, acute; and Total knee replacement status. Surgical History:   has a past surgical history that includes joint replacement (2008); Tonsillectomy; Prostate surgery; eye surgery; Total knee arthroplasty (2/1/2011); fracture surgery; Endoscopy, colon, diagnostic; Colonoscopy; Umbilical hernia repair (12/4/14); Inguinal hernia repair (Left, 12/4/14); and other surgical history (01/20/2016). Social History:   reports that he quit smoking about 43 years ago. He has a 40.00 pack-year smoking history.  He quit ejection   fraction of 55%. No regional wall motion abnormalities are seen.  Hakan Ramin is mild concentric left ventricular hypertrophy.   Diastolic filling parameters suggest grade I diastolic dysfunction.   The aortic root is mildly dilated.   The right atrium is mildly dilated.   Aortic valve appears tricuspid and mildly sclerotic but opens adequately.   There is mild tricuspid regurgitation.   Systolic pulmonary artery pressure (SPAP) is normal and estimated at 37 mmHg   (RA pressure 8 mmHg). Pertinent Problems:  · Bilateral lower extremity Edema- Improved  · chronic diastolic heart failure  · Atrial fibrillation not on anticoagulation due to falling  · Fatigue  · Chronic back pain  · Hx of Prostate CA  · Hx of Miller's Esophagus     Visit Diagnosis:    1. Generalized edema    2. Chronic diastolic heart failure (HCC)    3. Persistent atrial fibrillation ([address]      Plan:   1. Continue lasix 20mg every M,W,F  2. Check weights daily, and record once you get your scale. Once you have your weights, email the list to me to help track fluid balance. 3. If you gain 3lbs in a day or 5lbs in a week- okay to take an extra water pill (lasix)  4. Recheck BMP in 2 months as planned- fax labs to our office when you get them completed  5. Elevate your legs when possible  6. Stay as active as possible  7. Follow up Dr. Manuel Padilla 6 months or sooner if needed      QUALITY MEASURES  1. Tobacco Cessation Counseling: NA  2. Retake of BP if >140/90:   NA  3. Documentation to PCP/referring for new patient:  Sent to PCP at close of office visit  4. CAD patient on anti-platelet: NA  5. CAD patient on STATIN therapy:  NA  6. Patient with CHF and aFib on anticoagulation:  No, due to falls     I appreciate the opportunity for caring for this patient.      Sandra Berman, CNP, 12/12/2018, 11:42 AM

## 2019-06-11 NOTE — PROGRESS NOTES
[id number]  [facility]   Cardiovascular Evaluation    PATIENT: Chely Ponce  DATE: 2019  MRN: [id number]  CSN: [id number]  : 1927    Primary Care Doctor: Iqra Mireles MD    Reason for evaluation:   6 Month Follow-Up and Tremors    Subjective:     History of present illness on initial date of evaluation:   Chely Ponce is a 80 y.o. patient who presents for follow up. He reports that his wife recently passed away. He is feeling well overall. He lives downstairs from his daughter and states that he has good support. He is traveling to [place] next week for vacation with his family. He is taking his medications as prescribed without issue. Patient currently denies any weight gain, edema, palpitations, chest pain, shortness of breath, dizziness, and syncope. Patient Active Problem List   Diagnosis    Arthritis    Peptic ulcer disease    Total knee replacement status    Arrhythmia    Chest pain    Bradycardia    GERD (gastroesophageal reflux disease)    Contusion, hip    Primary osteoarthritis involving multiple joints    Paroxysmal atrial fibrillation (HCC)    Closed compression fracture of L5 lumbar vertebra    Compression fracture of body of thoracic vertebra (HCC)    Chronic diastolic heart failure (HCC)    Generalized edema    Persistent atrial fibrillation (HCC)       Past Medical History:   has a past medical history of Arthritis, Back injuries, Miller's esophagus, [address] Diarrhea, GERD (gastroesophageal reflux disease), Internal hemorrhoids without mention of complication, Osteoarthritis, Peptic ulcer disease, Peptic ulcer, unspecified site, unspecified as acute or chronic, without mention of hemorrhage or perforation, Reflux, Senile cataract, Sinusitis, acute, and Total knee replacement status. Surgical History:   has a past surgical history that includes joint replacement (); Tonsillectomy; Prostate surgery; eye surgery;  Total knee arthroplasty (2/1/2011); fracture surgery; Endoscopy, colon, diagnostic; Colonoscopy; Umbilical hernia repair (12/4/14); Inguinal hernia repair (Left, 12/4/14); and other surgical history (01/20/2016). Social History:   reports that he quit smoking about 43 years ago. He has a 40.00 pack-year smoking history. He quit smokeless tobacco use about 41 years ago. He reports that he drinks about 8.4 oz of alcohol per week. He reports that he does not use drugs. Family History:  No evidence for sudden cardiac death or premature CAD    Medications:  Reviewed and are listed in nursing record. and/or listed below  Outpatient Medications:  Prior to Admission medications    Medication Sig Start Date End Date Taking? Authorizing Provider   acetaminophen (APAP EXTRA STRENGTH) 500 MG tablet Take 500 mg by mouth every 6 hours as needed for Pain   Yes Historical Provider, MD   lidocaine (LIDODERM) 5 % Place 1 patch onto the skin daily 12 hours on, 12 hours off. Yes Historical Provider, MD   betamethasone dipropionate (DIPROLENE) 0.05 % ointment Apply topically 2 times daily Apply topically 2 times daily. Yes Historical Provider, MD   vitamin D (CHOLECALCIFEROL) 1000 units TABS tablet Take 1,000 Units by mouth daily   Yes Historical Provider, MD   CRANBERRY PO Take by mouth   Yes Historical Provider, MD   Nutritional Supplements (ENSURE) POWD Take by mouth   Yes Historical Provider, MD   Sodium Chloride, Hypertonic, (SODIUM CHLORIDE OP) Apply to eye   Yes Historical Provider, MD   Probiotic Product (PROBIOTIC DAILY PO) Take by mouth   Yes Historical Provider, MD   docusate sodium (COLACE) 100 MG capsule Take 100 mg by mouth daily   Yes Historical Provider, MD   polyethylene glycol (GLYCOLAX) powder Take 17 g by mouth daily   Yes Historical Provider, MD   nitroGLYCERIN (NITROSTAT) 0.4 MG SL tablet Place 0.4 mg under the tongue every 5 minutes as needed for Chest pain up to max of 3 total doses.  If no relief after 1 dose, call 911. Yes Historical Provider, MD   furosemide (LASIX) 20 MG tablet Take 1 tablet by mouth See Admin Instructions m-w-f only 8/8/18  Yes JIN Carlson - CNP   senna (SENOKOT) 8.6 MG tablet Take 1 tablet by mouth daily   Yes Historical Provider, MD   baclofen (LIORESAL) 10 MG tablet Take 10 mg by mouth nightly as needed   Yes Historical Provider, MD   HYDROcodone-acetaminophen (NORCO) 5-325 MG per tablet Take 1 tablet by mouth every 6 hours as needed for Pain. .   Yes Historical Provider, MD   fluticasone (FLONASE) 50 MCG/ACT nasal spray 2 sprays by Nasal route daily 8/25/16  Yes Colton Dey MD   omeprazole (PRILOSEC) 20 MG capsule Take 1 capsule by mouth daily. 4/13/11  Yes Colton Dey MD       In-patient schedule medications:        Infusion Medications: Allergies:  Ciprofloxacin     Review of Systems:   All 14 point review of symptoms completed. Pertinent positives identified in the HPI, all other review of symptoms negative as below.     Review of Systems - History obtained from the patient  General ROS: negative for - chills, fever or night sweats  Psychological ROS: negative for - disorientation or hallucinations  Ophthalmic ROS: negative for - dry eyes, eye pain or loss of vision  ENT ROS: negative for - nasal discharge or sore throat  Allergy and Immunology ROS: negative for - hives or itchy/watery eyes  Hematological and Lymphatic ROS: negative for - jaundice or night sweats  Endocrine ROS: negative for - mood swings or temperature intolerance  Breast ROS: deferred  Respiratory ROS: negative for - hemoptysis or stridor  Cardiovascular ROS: negative for - chest pain, dyspnea on exertion or palpitations  Gastrointestinal ROS: no abdominal pain, change in bowel habits, or black or bloody stools  Genito-Urinary ROS: no dysuria, trouble voiding, or hematuria  Musculoskeletal ROS: negative for - gait disturbance, joint pain or joint stiffness  Neurological Plan:  1. The current medical regimen is effective;  continue present plan and medications. 2. Follow up in 6 months    Scribe's attestation: This note was scribed in the presence of Dr. Anabella Osborne by Shelbie Raza RN    I, Dr. Anabella Osborne, personally performed the services described in this documentation, as scribed by the above signed scribe in my presence. It is both accurate and complete to my knowledge. I agree with the details independently gathered by the clinical support staff, while the remaining scribed note accurately describes my personal service to the patient.           Anabella Osborne MD, [facility]  [phone] [facility] office  [phone] [facility]  6/12/2019  12:35 PM

## 2019-06-12 ENCOUNTER — OFFICE VISIT (OUTPATIENT)
Dept: CARDIOLOGY CLINIC | Age: 84
End: 2019-06-12
Payer: COMMERCIAL

## 2019-06-12 VITALS
BODY MASS INDEX: 37.92 KG/M2 | SYSTOLIC BLOOD PRESSURE: 122 MMHG | HEIGHT: 68 IN | WEIGHT: 250.2 LBS | HEART RATE: 75 BPM | DIASTOLIC BLOOD PRESSURE: 70 MMHG | OXYGEN SATURATION: 96 %

## 2019-06-12 DIAGNOSIS — I48.0 PAROXYSMAL ATRIAL FIBRILLATION (HCC): ICD-10-CM

## 2019-06-12 DIAGNOSIS — I48.19 PERSISTENT ATRIAL FIBRILLATION (HCC): ICD-10-CM

## 2019-06-12 DIAGNOSIS — I48.91 ATRIAL FIBRILLATION, UNSPECIFIED TYPE (HCC): Primary | ICD-10-CM

## 2019-06-12 PROCEDURE — 99213 OFFICE O/P EST LOW 20 MIN: CPT | Performed by: INTERNAL MEDICINE

## 2019-06-12 NOTE — PATIENT INSTRUCTIONS
1. The current medical regimen is effective;  continue present plan and medications.   2. Follow up in 6 months

## 2019-12-17 LAB
BUN BLDV-MCNC: 14 MG/DL
CALCIUM SERPL-MCNC: 8.6 MG/DL
CHLORIDE BLD-SCNC: 100 MMOL/L
CO2: 30 MMOL/L
CREAT SERPL-MCNC: 0.97 MG/DL
GFR CALCULATED: NORMAL
GLUCOSE BLD-MCNC: 116 MG/DL
POTASSIUM SERPL-SCNC: 4.2 MMOL/L
SODIUM BLD-SCNC: 136 MMOL/L

## 2020-01-06 ENCOUNTER — OFFICE VISIT (OUTPATIENT)
Dept: CARDIOLOGY CLINIC | Age: 85
End: 2020-01-06
Payer: OTHER GOVERNMENT

## 2020-01-06 VITALS
DIASTOLIC BLOOD PRESSURE: 84 MMHG | HEIGHT: 68 IN | HEART RATE: 82 BPM | WEIGHT: 258 LBS | OXYGEN SATURATION: 97 % | BODY MASS INDEX: 39.1 KG/M2 | SYSTOLIC BLOOD PRESSURE: 134 MMHG

## 2020-01-06 PROCEDURE — 99214 OFFICE O/P EST MOD 30 MIN: CPT | Performed by: INTERNAL MEDICINE

## 2020-01-06 RX ORDER — GUAIFENESIN 600 MG/1
1200 TABLET, EXTENDED RELEASE ORAL 2 TIMES DAILY
COMMUNITY

## 2020-01-06 RX ORDER — LANOLIN ALCOHOL/MO/W.PET/CERES
3 CREAM (GRAM) TOPICAL NIGHTLY PRN
COMMUNITY

## 2020-01-06 NOTE — LETTER
Surgical History:   has a past surgical history that includes joint replacement (2008); Tonsillectomy; Prostate surgery; eye surgery; Total knee arthroplasty (2/1/2011); fracture surgery; Endoscopy, colon, diagnostic; Colonoscopy; Umbilical hernia repair (12/4/14); Inguinal hernia repair (Left, 12/4/14); and other surgical history (01/20/2016). Social History:   reports that he quit smoking about 44 years ago. He has a 40.00 pack-year smoking history. He quit smokeless tobacco use about 42 years ago. He reports current alcohol use of about 14.0 standard drinks of alcohol per week. He reports that he does not use drugs. Family History:  No evidence for sudden cardiac death or premature CAD    Medications:  Reviewed and are listed in nursing record. and/or listed below  Outpatient Medications:  Prior to Admission medications    Medication Sig Start Date End Date Taking? Authorizing Provider   guaiFENesin (MUCINEX) 600 MG extended release tablet Take 1,200 mg by mouth 2 times daily   Yes Historical Provider, MD   melatonin 3 MG TABS tablet Take 3 mg by mouth daily   Yes Historical Provider, MD   acetaminophen (APAP EXTRA STRENGTH) 500 MG tablet Take 500 mg by mouth every 6 hours as needed for Pain   Yes Historical Provider, MD   lidocaine (LIDODERM) 5 % Place 1 patch onto the skin daily 12 hours on, 12 hours off. Yes Historical Provider, MD   betamethasone dipropionate (DIPROLENE) 0.05 % ointment Apply topically 2 times daily Apply topically 2 times daily.    Yes Historical Provider, MD   vitamin D (CHOLECALCIFEROL) 1000 units TABS tablet Take 1,000 Units by mouth daily   Yes Historical Provider, MD   CRANBERRY PO Take by mouth   Yes Historical Provider, MD   Nutritional Supplements (ENSURE) POWD Take by mouth   Yes Historical Provider, MD   Sodium Chloride, Hypertonic, (SODIUM CHLORIDE OP) Apply to eye   Yes Historical Provider, MD   Probiotic Product (PROBIOTIC DAILY PO) Take by mouth   Yes Historical

## 2020-01-06 NOTE — PROGRESS NOTES
History:   has a past surgical history that includes joint replacement (2008); Tonsillectomy; Prostate surgery; eye surgery; Total knee arthroplasty (2/1/2011); fracture surgery; Endoscopy, colon, diagnostic; Colonoscopy; Umbilical hernia repair (12/4/14); Inguinal hernia repair (Left, 12/4/14); and other surgical history (01/20/2016). Social History:   reports that he quit smoking about 44 years ago. He has a 40.00 pack-year smoking history. He quit smokeless tobacco use about 42 years ago. He reports current alcohol use of about 14.0 standard drinks of alcohol per week. He reports that he does not use drugs. Family History:  No evidence for sudden cardiac death or premature CAD    Medications:  Reviewed and are listed in nursing record. and/or listed below  Outpatient Medications:  Prior to Admission medications    Medication Sig Start Date End Date Taking? Authorizing Provider   guaiFENesin (MUCINEX) 600 MG extended release tablet Take 1,200 mg by mouth 2 times daily   Yes Historical Provider, MD   melatonin 3 MG TABS tablet Take 3 mg by mouth daily   Yes Historical Provider, MD   acetaminophen (APAP EXTRA STRENGTH) 500 MG tablet Take 500 mg by mouth every 6 hours as needed for Pain   Yes Historical Provider, MD   lidocaine (LIDODERM) 5 % Place 1 patch onto the skin daily 12 hours on, 12 hours off. Yes Historical Provider, MD   betamethasone dipropionate (DIPROLENE) 0.05 % ointment Apply topically 2 times daily Apply topically 2 times daily.    Yes Historical Provider, MD   vitamin D (CHOLECALCIFEROL) 1000 units TABS tablet Take 1,000 Units by mouth daily   Yes Historical Provider, MD   CRANBERRY PO Take by mouth   Yes Historical Provider, MD   Nutritional Supplements (ENSURE) POWD Take by mouth   Yes Historical Provider, MD   Sodium Chloride, Hypertonic, (SODIUM CHLORIDE OP) Apply to eye   Yes Historical Provider, MD   Probiotic Product (PROBIOTIC DAILY PO) Take by mouth   Yes Historical Provider, MD

## 2020-07-28 ENCOUNTER — OFFICE VISIT (OUTPATIENT)
Dept: CARDIOLOGY CLINIC | Age: 85
End: 2020-07-28
Payer: OTHER GOVERNMENT

## 2020-07-28 VITALS
OXYGEN SATURATION: 95 % | HEART RATE: 70 BPM | DIASTOLIC BLOOD PRESSURE: 60 MMHG | SYSTOLIC BLOOD PRESSURE: 110 MMHG | HEIGHT: 70 IN | BODY MASS INDEX: 36.72 KG/M2 | WEIGHT: 256.5 LBS

## 2020-07-28 PROCEDURE — G8417 CALC BMI ABV UP PARAM F/U: HCPCS | Performed by: INTERNAL MEDICINE

## 2020-07-28 PROCEDURE — G8427 DOCREV CUR MEDS BY ELIG CLIN: HCPCS | Performed by: INTERNAL MEDICINE

## 2020-07-28 PROCEDURE — 1036F TOBACCO NON-USER: CPT | Performed by: INTERNAL MEDICINE

## 2020-07-28 PROCEDURE — 99214 OFFICE O/P EST MOD 30 MIN: CPT | Performed by: INTERNAL MEDICINE

## 2020-07-28 PROCEDURE — 1123F ACP DISCUSS/DSCN MKR DOCD: CPT | Performed by: INTERNAL MEDICINE

## 2020-07-28 PROCEDURE — 4040F PNEUMOC VAC/ADMIN/RCVD: CPT | Performed by: INTERNAL MEDICINE

## 2020-07-28 NOTE — PROGRESS NOTES
[facility]   Cardiovascular Evaluation    PATIENT: Laurita Eduardo  DATE: 2020  MRN: <[id number]>  CSN: [id number]  : 1927    [facility]    Reason for evaluation:   6 Month Follow-Up and Atrial Fibrillation    Subjective:     History of present illness on initial date of evaluation:   Laurita Eduardo is a 80 y.o. patient who presents for follow up. Today he reports that he has been struggling with diarrhea. He has redness on his arms that he feels is related to the Miralax that he has been taking. He has been feeling well from a cardiac standpoint. He presents with his daughter who he lives with. Birgit Rosales feels that his breathing is stable. His daughter feels that he has been relatively stable. She reports that his swelling has been stable until recently when his activity has diminished due to the weather. He has been using the compression boots provided by the [employer] . He complains of losing his balance at times, using his cane for assistance. He denies dizziness.      Patient Active Problem List   Diagnosis    Arthritis    Peptic ulcer disease    Total knee replacement status    Arrhythmia    Chest pain    Bradycardia    GERD (gastroesophageal reflux disease)    Contusion, hip    Primary osteoarthritis involving multiple joints    Atrial fibrillation (HCC)    Closed compression fracture of L5 lumbar vertebra    Compression fracture of body of thoracic vertebra (HCC)    Chronic diastolic heart failure (HCC)    Generalized edema    Persistent atrial fibrillation       Past Medical History:   has a past medical history of Arthritis, Back injuries, Miller's esophagus, Cancer ([address] Diarrhea, GERD (gastroesophageal reflux disease), Internal hemorrhoids without mention of complication, Osteoarthritis, Peptic ulcer disease, Peptic ulcer, unspecified site, unspecified as acute or chronic, without mention of hemorrhage or perforation, Reflux, Senile cataract, Sinusitis, acute, and Total knee replacement status. Surgical History:   has a past surgical history that includes joint replacement (2008); Tonsillectomy; Prostate surgery; eye surgery; Total knee arthroplasty (2/1/2011); fracture surgery; Endoscopy, colon, diagnostic; Colonoscopy; Umbilical hernia repair (12/4/14); Inguinal hernia repair (Left, 12/4/14); and other surgical history (01/20/2016). Social History:   reports that he quit smoking about 44 years ago. He has a 40.00 pack-year smoking history. He quit smokeless tobacco use about 42 years ago. He reports current alcohol use of about 14.0 standard drinks of alcohol per week. He reports that he does not use drugs. Family History:  No evidence for sudden cardiac death or premature CAD    Medications:  Reviewed and are listed in nursing record. and/or listed below  Outpatient Medications:  Prior to Admission medications    Medication Sig Start Date End Date Taking? Authorizing Provider   guaiFENesin (MUCINEX) 600 MG extended release tablet Take 1,200 mg by mouth 2 times daily   Yes Historical Provider, MD   melatonin 3 MG TABS tablet Take 3 mg by mouth daily   Yes Historical Provider, MD   acetaminophen (APAP EXTRA STRENGTH) 500 MG tablet Take 500 mg by mouth every 6 hours as needed for Pain   Yes Historical Provider, MD   lidocaine (LIDODERM) 5 % Place 1 patch onto the skin daily 12 hours on, 12 hours off. Yes Historical Provider, MD   vitamin D (CHOLECALCIFEROL) 1000 units TABS tablet Take 1,000 Units by mouth daily   Yes Historical Provider, MD   CRANBERRY PO Take by mouth   Yes Historical Provider, MD   Nutritional Supplements (ENSURE) POWD Take by mouth   Yes Historical Provider, MD   Probiotic Product (PROBIOTIC DAILY PO) Take by mouth   Yes Historical Provider, MD   nitroGLYCERIN (NITROSTAT) 0.4 MG SL tablet Place 0.4 mg under the tongue every 5 minutes as needed for Chest pain up to max of 3 total doses.  If no relief after 1 dose, call 911. Yes Historical Provider, MD   furosemide (LASIX) 20 MG tablet Take 1 tablet by mouth See Admin Instructions m-w-f only 8/8/18  Yes JIN Drew - CNP   senna (SENOKOT) 8.6 MG tablet Take 1 tablet by mouth daily   Yes Historical Provider, MD   fluticasone (FLONASE) 50 MCG/ACT nasal spray 2 sprays by Nasal route daily 8/25/16  Yes Mylene Ascencio MD   omeprazole (PRILOSEC) 20 MG capsule Take 1 capsule by mouth daily. 4/13/11  Yes Mylene Ascencio MD   betamethasone dipropionate (DIPROLENE) 0.05 % ointment Apply topically 2 times daily Apply topically 2 times daily. Historical Provider, MD   Sodium Chloride, Hypertonic, (SODIUM CHLORIDE OP) Apply to eye    Historical Provider, MD   docusate sodium (COLACE) 100 MG capsule Take 100 mg by mouth daily    Historical Provider, MD   polyethylene glycol (GLYCOLAX) powder Take 17 g by mouth daily    Historical Provider, MD   baclofen (LIORESAL) 10 MG tablet Take 10 mg by mouth nightly as needed    Historical Provider, MD   HYDROcodone-acetaminophen (NORCO) 5-325 MG per tablet Take 1 tablet by mouth every 6 hours as needed for Pain. Shaan Null Historical Provider, MD       In-patient schedule medications:        Infusion Medications: Allergies:  Ciprofloxacin     Review of Systems:   All 14 point review of symptoms completed. Pertinent positives identified in the HPI, all other review of symptoms negative as below.     Review of Systems - History obtained from the patient  General ROS: negative for - chills, fever or night sweats  Psychological ROS: negative for - disorientation or hallucinations  Ophthalmic ROS: negative for - dry eyes, eye pain or loss of vision  ENT ROS: negative for - nasal discharge or sore throat  Allergy and Immunology ROS: negative for - hives or itchy/watery eyes  Hematological and Lymphatic ROS: negative for - jaundice or night sweats  Endocrine ROS: negative for - mood swings or temperature intolerance  Breast ROS: deferred  Respiratory ROS: negative for - hemoptysis or stridor  Cardiovascular ROS: negative for - chest pain, dyspnea on exertion or palpitations  Gastrointestinal ROS: no abdominal pain, change in bowel habits, or black or bloody stools  Genito-Urinary ROS: no dysuria, trouble voiding, or hematuria  Musculoskeletal ROS: negative for - gait disturbance, joint pain or joint stiffness  Neurological ROS: negative for - seizures or speech problems  Dermatological ROS: negative for - rash or skin lesion changes     Physical Examination:    Vitals:    07/28/20 1018   BP: 110/60   Pulse: 70   SpO2: 95%    Weight: 256 lb 8 oz (116.3 kg)     Wt Readings from Last 3 Encounters:   07/28/20 256 lb 8 oz (116.3 kg)   01/06/20 258 lb (117 kg)   06/12/19 250 lb 3.2 oz (113.5 kg)     No intake or output data in the 24 hours ending 07/28/20 1036    General Appearance:  Alert, cooperative, no distress, appears stated age   Head:  Normocephalic, without obvious abnormality, atraumatic   Eyes:  PERRL, conjunctiva/corneas clear       Nose: Nares normal, no drainage or sinus tenderness   Throat: Lips, mucosa, and tongue normal   Neck: Supple, symmetrical, trachea midline, no adenopathy, thyroid: not enlarged, symmetric, no tenderness/mass/nodules, no carotid bruit or JVD       Lungs:   Clear to auscultation bilaterally, respirations unlabored   Chest Wall:  No tenderness or deformity   Heart:  Irregular rhythm and rate; S1, S2 are normal; no murmur noted; no rub or gallop   Abdomen:   Soft, non-tender, bowel sounds active all four quadrants,  no masses, no organomegaly           Extremities: Extremities normal, atraumatic, no cyanosis or edema   Pulses: 2+ and symmetric   Skin: Skin color, texture, turgor normal, no rashes or lesions   Pysch: Normal mood and affect   Neurologic: Normal gross motor and sensory exam.         Labs  No results for input(s): WBC, HGB, HCT, MCV, PLT in the last 72

## 2020-07-28 NOTE — PATIENT INSTRUCTIONS
Plan:  1. I recommend that the patient continue their currently prescribed medications. Their drug modifiable risk factors appear to be well controlled. I will continue to address the need/dosing of medications in future visits. 2. Follow up with Bharati Miller MD regarding your diarrhea. 3. Follow up with me in 1 year.

## 2021-04-20 ENCOUNTER — HOSPITAL ENCOUNTER (EMERGENCY)
Age: 86
Discharge: HOME | End: 2021-04-20
Attending: EMERGENCY MEDICINE
Payer: MEDICARE

## 2021-04-20 ENCOUNTER — APPOINTMENT (OUTPATIENT)
Dept: GENERAL RADIOLOGY | Age: 86
End: 2021-04-20
Payer: MEDICARE

## 2021-04-20 ENCOUNTER — APPOINTMENT (OUTPATIENT)
Dept: CT IMAGING | Age: 86
End: 2021-04-20
Payer: MEDICARE

## 2021-04-20 VITALS
DIASTOLIC BLOOD PRESSURE: 75 MMHG | WEIGHT: 250 LBS | OXYGEN SATURATION: 98 % | TEMPERATURE: 98.1 F | RESPIRATION RATE: 16 BRPM | SYSTOLIC BLOOD PRESSURE: 164 MMHG | BODY MASS INDEX: 37.03 KG/M2 | HEART RATE: 78 BPM | HEIGHT: 69 IN

## 2021-04-20 DIAGNOSIS — S76.012A HIP STRAIN, LEFT, INITIAL ENCOUNTER: Primary | ICD-10-CM

## 2021-04-20 PROCEDURE — 73502 X-RAY EXAM HIP UNI 2-3 VIEWS: CPT

## 2021-04-20 PROCEDURE — 73700 CT LOWER EXTREMITY W/O DYE: CPT

## 2021-04-20 PROCEDURE — 99284 EMERGENCY DEPT VISIT MOD MDM: CPT

## 2021-04-20 PROCEDURE — 6360000002 HC RX W HCPCS: Performed by: EMERGENCY MEDICINE

## 2021-04-20 PROCEDURE — 6370000000 HC RX 637 (ALT 250 FOR IP): Performed by: EMERGENCY MEDICINE

## 2021-04-20 PROCEDURE — 96372 THER/PROPH/DIAG INJ SC/IM: CPT

## 2021-04-20 RX ORDER — METHYLPREDNISOLONE SODIUM SUCCINATE 125 MG/2ML
80 INJECTION, POWDER, LYOPHILIZED, FOR SOLUTION INTRAMUSCULAR; INTRAVENOUS ONCE
Status: COMPLETED | OUTPATIENT
Start: 2021-04-20 | End: 2021-04-20

## 2021-04-20 RX ORDER — OXYCODONE HYDROCHLORIDE AND ACETAMINOPHEN 5; 325 MG/1; MG/1
1 TABLET ORAL ONCE
Status: COMPLETED | OUTPATIENT
Start: 2021-04-20 | End: 2021-04-20

## 2021-04-20 RX ORDER — TAMSULOSIN HYDROCHLORIDE 0.4 MG/1
0.4 CAPSULE ORAL DAILY
COMMUNITY

## 2021-04-20 RX ADMIN — OXYCODONE HYDROCHLORIDE AND ACETAMINOPHEN 1 TABLET: 5; 325 TABLET ORAL at 14:41

## 2021-04-20 RX ADMIN — METHYLPREDNISOLONE SODIUM SUCCINATE 80 MG: 125 INJECTION, POWDER, FOR SOLUTION INTRAMUSCULAR; INTRAVENOUS at 14:41

## 2021-04-22 NOTE — ED PROVIDER NOTES
acute or chronic, without mention of hemorrhage or perforation     BLEEDING PEPTIC ULCER    Reflux     Senile cataract 10/30/06    unspecified    Sinusitis, acute 07    Total knee replacement status 2011     Past Surgical History:   Procedure Laterality Date    COLONOSCOPY      ENDOSCOPY, COLON, DIAGNOSTIC      EYE SURGERY      cataract  bilat    FRACTURE SURGERY      sternum    INGUINAL HERNIA REPAIR Left 14    WITH MESH    JOINT REPLACEMENT      right knee    OTHER SURGICAL HISTORY  2016    excsion of lesion on the nose with frozem section, plastic closure and full thickness skin graft from left neck. Excision of lesion on left neck    PROSTATE SURGERY      seed implant    TONSILLECTOMY      TOTAL KNEE ARTHROPLASTY  2011    Left total Knee Arthroplasty    UMBILICAL HERNIA REPAIR  14     Family History   Problem Relation Age of Onset    Heart Disease Neg Hx      Social History     Socioeconomic History    Marital status:      Spouse name: Not on file    Number of children: Not on file    Years of education: Not on file    Highest education level: Not on file   Occupational History    Not on file   Social Needs    Financial resource strain: Not on file    Food insecurity     Worry: Not on file     Inability: Not on file    Transportation needs     Medical: Not on file     Non-medical: Not on file   Tobacco Use    Smoking status: Former Smoker     Packs/day: 2.00     Years: 20.00     Pack years: 40.00     Quit date: 1975     Years since quittin.6    Smokeless tobacco: Former User     Quit date: 1977    Tobacco comment: QUIT X 20 YRS    Substance and Sexual Activity    Alcohol use:  Yes     Alcohol/week: 14.0 standard drinks     Types: 14 Cans of beer per week    Drug use: No    Sexual activity: Not on file   Lifestyle    Physical activity     Days per week: Not on file     Minutes per session: Not on file    Stress: Not on file Relationships    Social connections     Talks on phone: Not on file     Gets together: Not on file     Attends Advent service: Not on file     Active member of club or organization: Not on file     Attends meetings of clubs or organizations: Not on file     Relationship status: Not on file    Intimate partner violence     Fear of current or ex partner: Not on file     Emotionally abused: Not on file     Physically abused: Not on file     Forced sexual activity: Not on file   Other Topics Concern    Not on file   Social History Narrative    Not on file     No current facility-administered medications for this encounter. Current Outpatient Medications   Medication Sig Dispense Refill    HYDROPHILIC EX Apply topically Apply small amount topically every day on areas of dry skin      LACTOBACILLUS ACID-PECTIN PO Take 1 tablet by mouth daily      METHYL SALICYLATE-LIDO-MENTHOL EX Apply 10-15 % topically      tamsulosin (FLOMAX) 0.4 MG capsule Take 0.4 mg by mouth daily      CANNABIDIOL PO Take 100 mg by mouth 5ml PO at bedtime      guaiFENesin (MUCINEX) 600 MG extended release tablet Take 1,200 mg by mouth 2 times daily      melatonin 3 MG TABS tablet Take 3 mg by mouth nightly as needed       acetaminophen (APAP EXTRA STRENGTH) 500 MG tablet Take 500 mg by mouth every 6 hours as needed for Pain      lidocaine (LIDODERM) 5 % Place 1 patch onto the skin daily 12 hours on, 12 hours off.  betamethasone dipropionate (DIPROLENE) 0.05 % ointment Apply topically 2 times daily Apply topically 2 times daily.       vitamin D (CHOLECALCIFEROL) 1000 units TABS tablet Take 1,000 Units by mouth daily Take two tablets PO daily      CRANBERRY PO Take by mouth      Nutritional Supplements (ENSURE) POWD Take by mouth      Sodium Chloride, Hypertonic, (SODIUM CHLORIDE OP) Apply to eye      docusate sodium (COLACE) 100 MG capsule Take 100 mg by mouth daily      polyethylene glycol (GLYCOLAX) powder Take 17 g by mouth daily      nitroGLYCERIN (NITROSTAT) 0.4 MG SL tablet Place 0.4 mg under the tongue every 5 minutes as needed for Chest pain up to max of 3 total doses. If no relief after 1 dose, call 911.  furosemide (LASIX) 20 MG tablet Take 1 tablet by mouth See Admin Instructions m-w-f only 36 tablet 1    senna (SENOKOT) 8.6 MG tablet Take 1 tablet by mouth daily      baclofen (LIORESAL) 10 MG tablet Take 10 mg by mouth nightly as needed      HYDROcodone-acetaminophen (NORCO) 5-325 MG per tablet Take 1 tablet by mouth every 6 hours as needed for Pain. .      fluticasone (FLONASE) 50 MCG/ACT nasal spray 2 sprays by Nasal route daily 1 Bottle 2    omeprazole (PRILOSEC) 20 MG capsule Take 1 capsule by mouth daily. 30 capsule 5     Allergies   Allergen Reactions    Ciprofloxacin      Light headed       REVIEW OF SYSTEMS  10 systems reviewed, pertinent positives per HPI otherwise noted to be negative. PHYSICAL EXAM  BP (!) 164/75   Pulse 78   Temp 98.1 °F (36.7 °C) (Oral)   Resp 16   Ht 5' 9\" (1.753 m)   Wt 250 lb (113.4 kg)   SpO2 98%   BMI 36.92 kg/m²    Physical exam:  General appearance: awake and cooperative. no distress at rest but becomes in pain distress with movement. Non toxic appearing. Skin: Warm and dry. No rashes or lesions. HENT: Normocephalic. Atraumatic. Mucus membranes are moist   Neck: supple  Eyes: ARNOLD. EOM intact. Heart: RRR. No murmurs. Lungs: Respirations unlabored. CTAB. No wheezes, rales, or rhonchi. Good air exchange  Abdomen: No tenderness. Soft. Non distended. No peritoneal signs. Musculoskeletal: left hip: pain with active ROM with flexion, extension, and abduction; no pain with passive ROM; no joint erythema or swelling noted. No specific tenderness over greater trochanter. Quadriceps and patellar tendons intact. Knee and ankle ROM fully intact. No extremity edema. Compartments soft. No deformity. All extremities neurovascularly intact.  Radial, Dp, and PT pulses +2/4 bilaterally. Feet well perfused. Neurological: Alert and oriented. Knee flexion/extension strength 5/5 but somewhat limited due to pain; ankle plantarflexion/dorsiflexion and toe flexion/extension 5/5 strength bilaterally; hip flexors strength testing limited by pain with active movement. Sensation intact bilateral lower extremities. No focal deficits. No aphasia or dysarthria. No gait ataxia but gait slow due to pain. Psychiatric: Normal mood and affect. LABS  I have reviewed all labs for this visit. No results found for this visit on 04/20/21. ECG    RADIOLOGY  Xr Hip Left (2-3 Views)    Result Date: 4/20/2021  EXAMINATION: TWO XRAY VIEWS OF THE LEFT HIP 4/20/2021 11:57 am COMPARISON: None. HISTORY: ORDERING SYSTEM PROVIDED HISTORY: pain; unable to lift leg; no acute injury or fall TECHNOLOGIST PROVIDED HISTORY: Reason for exam:->pain; unable to lift leg; no acute injury or fall Reason for Exam: pain in LT hip, no injury Acuity: Acute Type of Exam: Initial FINDINGS: No fracture or destructive bone lesion. No arthritic change. The femoral head has a normal contour. Prostatic radiation seeds incidentally noted     Negative for fracture. No significant abnormality of the left hip and proximal left femur     Ct Hip Left Wo Contrast    Result Date: 4/20/2021  EXAMINATION: CT OF THE LEFT HIP WITHOUT CONTRAST 4/20/2021 2:23 pm TECHNIQUE: CT of the left hip was performed without the administration of intravenous contrast.  Multiplanar reformatted images are provided for review. Dose modulation, iterative reconstruction, and/or weight based adjustment of the mA/kV was utilized to reduce the radiation dose to as low as reasonably achievable. COMPARISON: Left hip radiographs 04/20/2021. CT abdomen and pelvis 03/30/2018.  HISTORY ORDERING SYSTEM PROVIDED HISTORY: pain, troulbe ambulating after injury TECHNOLOGIST PROVIDED HISTORY: Reason for exam:->pain, troulbe ambulating after injury Decision Support Exception->Emergency Medical Condition (MA) Reason for Exam: left hip pain since friday, unable to move without pain, xrays earlier today Acuity: Acute Type of Exam: Initial FINDINGS: Bones: No fracture or dislocation. No suspicious lytic blastic osseous lesion. Soft Tissue: The urinary bladder is normal in appearance. Prosthetic radiation beads are in place. Sigmoid colon diverticulosis. No free fluid in the pelvis. No pelvic or inguinal lymphadenopathy. Suspected mildly edematous left psoas, iliacus, and distal iliopsoas muscles with mild adjacent stranding. Joint:  Multilevel degenerative changes of the lower lumbar spine. Moderate degenerative changes of the bilateral sacroiliac joints. Mild pubic symphysis degenerative changes. Mild degenerative changes of the bilateral hips. 1. No acute osseous abnormality. 2. Suspect edema within the psoas, iliacus, and distal iliopsoas muscles with mild adjacent stranding. This is nonspecific with the differential including strains and myositis. If clinically indicated further evaluation could be obtained with MRI. ED COURSE/MDM  Patient seen and evaluated. Old records reviewed. Labs and imaging reviewed and results discussed with patient. This is a 80-year-old male presenting with left hip pain. He had a possible injury and strain 2 weeks ago, but the pain really has significantly worsened over the past few days to the point where he is having trouble ambulating. He lives at home with his daughter, she has been helping him at home. His vital signs are within normal limits. On exam, he is well-appearing and appears to be in no distress when at rest.  He is in pain distress when moving his left hip and when ambulating. He is given pain medication here. He is neuro vascularly intact, I do not feel his pain represents neurologic or vascular pathology today.   He does not have any pain with passive range of motion, I do not suspect septic joint.  The pain appears to be more related to movement and activation of hip flexors. X-ray is negative for anything acute. I doubt that the patient could be experiencing an occult fracture based on his injury 2 weeks ago, but I did order a CT left hip. This does not show acute bony pathology, however, there is evidence of stranding and potential edema around the musculature. In this setting, I feel this more represents a strain as opposed to a myositis. However I did speak with the patient and daughter regarding this finding. I offered that the patient be admitted for pain control, physical therapy, and further work-up with MRI to exclude a myositis as the cause of his symptoms. However, daughter states that she is fully comfortable taking the patient home and prefers to have the work-up performed outpatient. She states that she is able to follow-up with the primary care physician in the morning and will ask about the MRI. She states that physical therapy does come to the house. I feel this is reasonable since this most likely represents a strain, and the patient is well-appearing on exam.  I did speak with the patient and his daughter regarding strict return precautions and they voiced understanding. During the patient's ED course, the patient was given:  Medications   oxyCODONE-acetaminophen (PERCOCET) 5-325 MG per tablet 1 tablet (1 tablet Oral Given 4/20/21 1441)   methylPREDNISolone sodium (SOLU-MEDROL) injection 80 mg (80 mg Intramuscular Given 4/20/21 1441)        CLINICAL IMPRESSION  1. Hip strain, left, initial encounter        Blood pressure (!) 164/75, pulse 78, temperature 98.1 °F (36.7 °C), temperature source Oral, resp. rate 16, height 5' 9\" (1.753 m), weight 250 lb (113.4 kg), SpO2 98 %. Patient was given scripts for the following medications. I counseled patient how to take these medications.    Discharge Medication List as of 4/20/2021  4:21 PM          Follow-up

## 2021-09-15 ENCOUNTER — OFFICE VISIT (OUTPATIENT)
Dept: CARDIOLOGY CLINIC | Age: 86
End: 2021-09-15
Payer: MEDICARE

## 2021-09-15 VITALS
BODY MASS INDEX: 38.95 KG/M2 | HEIGHT: 68 IN | HEART RATE: 66 BPM | DIASTOLIC BLOOD PRESSURE: 82 MMHG | SYSTOLIC BLOOD PRESSURE: 156 MMHG | OXYGEN SATURATION: 92 % | WEIGHT: 257 LBS

## 2021-09-15 DIAGNOSIS — I50.32 CHRONIC DIASTOLIC HEART FAILURE (HCC): Primary | ICD-10-CM

## 2021-09-15 DIAGNOSIS — I48.91 ATRIAL FIBRILLATION, UNSPECIFIED TYPE (HCC): ICD-10-CM

## 2021-09-15 PROCEDURE — G8417 CALC BMI ABV UP PARAM F/U: HCPCS | Performed by: INTERNAL MEDICINE

## 2021-09-15 PROCEDURE — 1036F TOBACCO NON-USER: CPT | Performed by: INTERNAL MEDICINE

## 2021-09-15 PROCEDURE — 1123F ACP DISCUSS/DSCN MKR DOCD: CPT | Performed by: INTERNAL MEDICINE

## 2021-09-15 PROCEDURE — 4040F PNEUMOC VAC/ADMIN/RCVD: CPT | Performed by: INTERNAL MEDICINE

## 2021-09-15 PROCEDURE — G8427 DOCREV CUR MEDS BY ELIG CLIN: HCPCS | Performed by: INTERNAL MEDICINE

## 2021-09-15 PROCEDURE — 99213 OFFICE O/P EST LOW 20 MIN: CPT | Performed by: INTERNAL MEDICINE

## 2021-09-15 NOTE — PROGRESS NOTES
Sinusitis, acute, and Total knee replacement status. Surgical History:   has a past surgical history that includes joint replacement (2008); Tonsillectomy; Prostate surgery; eye surgery; Total knee arthroplasty (2/1/2011); fracture surgery; Endoscopy, colon, diagnostic; Colonoscopy; Umbilical hernia repair (12/4/14); Inguinal hernia repair (Left, 12/4/14); and other surgical history (01/20/2016). Social History:   reports that he quit smoking about 46 years ago. He has a 40.00 pack-year smoking history. He quit smokeless tobacco use about 44 years ago. He reports current alcohol use of about 14.0 standard drinks of alcohol per week. He reports that he does not use drugs. Family History:  No evidence for sudden cardiac death or premature CAD    Medications:  Reviewed and are listed in nursing record. and/or listed below  Outpatient Medications:  Prior to Admission medications    Medication Sig Start Date End Date Taking? Authorizing Provider   HYDROPHILIC EX Apply topically Apply small amount topically every day on areas of dry skin   Yes Historical Provider, MD   LACTOBACILLUS ACID-PECTIN PO Take 1 tablet by mouth daily   Yes Historical Provider, MD   METHYL SALICYLATE-LIDO-MENTHOL EX Apply 10-15 % topically   Yes Historical Provider, MD   tamsulosin (FLOMAX) 0.4 MG capsule Take 0.4 mg by mouth daily   Yes Historical Provider, MD   CANNABIDIOL PO Take 100 mg by mouth 5ml PO at bedtime   Yes Historical Provider, MD   guaiFENesin (MUCINEX) 600 MG extended release tablet Take 1,200 mg by mouth 2 times daily   Yes Historical Provider, MD   melatonin 3 MG TABS tablet Take 3 mg by mouth nightly as needed    Yes Historical Provider, MD   acetaminophen (APAP EXTRA STRENGTH) 500 MG tablet Take 500 mg by mouth every 6 hours as needed for Pain   Yes Historical Provider, MD   betamethasone dipropionate (DIPROLENE) 0.05 % ointment Apply topically 2 times daily Apply topically 2 times daily.    Yes Historical Provider, MD   vitamin D (CHOLECALCIFEROL) 1000 units TABS tablet Take 1,000 Units by mouth daily Take two tablets PO daily   Yes Historical Provider, MD   CRANBERRY PO Take by mouth   Yes Historical Provider, MD   Nutritional Supplements (ENSURE) POWD Take by mouth   Yes Historical Provider, MD   Sodium Chloride, Hypertonic, (SODIUM CHLORIDE OP) Apply to eye   Yes Historical Provider, MD   polyethylene glycol (GLYCOLAX) powder Take 17 g by mouth daily   Yes Historical Provider, MD   nitroGLYCERIN (NITROSTAT) 0.4 MG SL tablet Place 0.4 mg under the tongue every 5 minutes as needed for Chest pain up to max of 3 total doses. If no relief after 1 dose, call 911. Yes Historical Provider, MD   furosemide (LASIX) 20 MG tablet Take 1 tablet by mouth See Admin Instructions m-w-f only 8/8/18  Yes JIN Steel - CNP   senna (SENOKOT) 8.6 MG tablet Take 1 tablet by mouth daily   Yes Historical Provider, MD   fluticasone (FLONASE) 50 MCG/ACT nasal spray 2 sprays by Nasal route daily 8/25/16  Yes Víctor Ornelas MD   omeprazole (PRILOSEC) 20 MG capsule Take 1 capsule by mouth daily. 4/13/11  Yes Víctor Ornelas MD   lidocaine (LIDODERM) 5 % Place 1 patch onto the skin daily 12 hours on, 12 hours off. Patient not taking: Reported on 9/15/2021    Historical Provider, MD   docusate sodium (COLACE) 100 MG capsule Take 100 mg by mouth daily  Patient not taking: Reported on 9/15/2021    Historical Provider, MD   baclofen (LIORESAL) 10 MG tablet Take 10 mg by mouth nightly as needed  Patient not taking: Reported on 9/15/2021    Ayaka Provider, MD   HYDROcodone-acetaminophen (NORCO) 5-325 MG per tablet Take 1 tablet by mouth every 6 hours as needed for Pain. .  Patient not taking: Reported on 9/15/2021    Historical Provider, MD       In-patient schedule medications:        Infusion Medications: Allergies:  Ciprofloxacin     Review of Systems:   All 14 point review of symptoms completed.  Pertinent positives identified in the HPI, all other review of symptoms findings as below.      Review of Systems - History obtained from the patient  General ROS: negative for - chills, fever or night sweats  Psychological ROS: negative for - disorientation or hallucinations  Ophthalmic ROS: negative for - dry eyes, eye pain or loss of vision  ENT ROS: negative for - nasal discharge or sore throat  Allergy and Immunology ROS: negative for - hives or itchy/watery eyes  Hematological and Lymphatic ROS: negative for - jaundice or night sweats  Endocrine ROS: negative for - mood swings or temperature intolerance  Breast ROS: deferred  Respiratory ROS: negative for - hemoptysis or stridor  Gastrointestinal ROS: no abdominal pain, change in bowel habits, or black or bloody stools  Genito-Urinary ROS: no dysuria, trouble voiding, or hematuria  Musculoskeletal ROS: negative for - gait disturbance, joint pain or joint stiffness  Neurological ROS: negative for - seizures or speech problems  Dermatological ROS: negative for - rash or skin lesion changes      Physical Examination:    BP (!) 156/82   Pulse 66   Ht 5' 8\" (1.727 m)   Wt 257 lb (116.6 kg)   SpO2 92%   BMI 39.08 kg/m²   BP (!) 156/82   Pulse 66   Ht 5' 8\" (1.727 m)   Wt 257 lb (116.6 kg)   SpO2 92%   BMI 39.08 kg/m²    Weight: 257 lb (116.6 kg)     Wt Readings from Last 3 Encounters:   09/15/21 257 lb (116.6 kg)   04/20/21 250 lb (113.4 kg)   07/28/20 256 lb 8 oz (116.3 kg)     No intake or output data in the 24 hours ending 09/15/21 1330    General Appearance:  Alert, cooperative, no distress, appears stated age   Head:  Normocephalic, without obvious abnormality, atraumatic   Eyes:  PERRL, conjunctiva/corneas clear       Nose: Nares normal, no drainage or sinus tenderness   Throat: Lips, mucosa, and tongue normal   Neck: Supple, symmetrical, trachea midline, no adenopathy, thyroid: not enlarged, symmetric, no tenderness/mass/nodules, no carotid bruit or JVD Lungs:   Clear to auscultation bilaterally, respirations unlabored   Chest Wall:  No tenderness or deformity   Heart:  Regular rhythm and normal rate; S1, S2 are normal; no murmur noted; no rub or gallop   Abdomen:   Soft, non-tender, bowel sounds active all four quadrants,  no masses, no organomegaly           Extremities: Extremities normal, atraumatic, no cyanosis or edema   Pulses: 2+ and symmetric   Skin: Skin color, texture, turgor normal, no rashes or lesions   Pysch: Normal mood and affect   Neurologic: Normal gross motor and sensory exam.         Labs  No results for input(s): WBC, HGB, HCT, MCV, PLT in the last 72 hours. No results for input(s): CREATININE, BUN, NA, K, CL, CO2 in the last 72 hours. No results for input(s): INR, PROTIME in the last 72 hours. No results for input(s): TROPONINI in the last 72 hours. Invalid input(s): PRO-BNP  No results for input(s): CHOL, HDL in the last 72 hours. Invalid input(s): LDL, TG      Imaging:  I have reviewed the below testing personally and my interpretation is below. EKG:  CXR:      Assessment:  80 y.o. patient with:  Active Problems:    * No active hospital problems. *  Resolved Problems:    * No resolved hospital problems. *    1. Paroxysmal Atrial Fibrillation   ~CHADVASC 2   ~no anticoagulation due to age and compliance/saferty concerns.  ~ Daughter in agreement  2. Chronic diastolic heart failure   ~Echo 1/2016 55%    ~lower extremity edema   ~Stable       Problem List Items Addressed This Visit     Atrial fibrillation ([facility] [address]    Chronic diastolic heart failure ([facility] [address] - Primary          Plan:  1. Low Sodium Diet ~ 2 GM Daily ~ swelling to feet  2. Fluid Restriction 2 Liter per day ~ swelling to feet  3. May take Lasix 20 mg one extra tablet in the evenings as needed only for swelling ~ fluid retention  4. May proceed with dental work without any limitations. 5. Follow up in one year or sooner if needed. Scribe's attestation:   This note was scribed in the presence of En Cee by Sri Guerrero RN     I, Dr. Zhane Mijares, personally performed the services described in this documentation, as scribed by the above signed scribe in my presence. It is both accurate and complete to my knowledge. I agree with the details independently gathered by the clinical support staff, while the remaining scribed note accurately describes my personal service to the patient. Medical Decision Making: The following items were considered in medical decision making:  Independent review of images  Review / order clinical lab tests  Review / order radiology tests  Decision to obtain old records  Review and summation of old records as accessed through [employer] (a summary of my findings in these old records)          Time Based Itemization  A total of 20 minutes was spent on today's patient encounter. If applicable, non-patient-facing activities:  (X )Preparing to see the patient and reviewing records  (X ) Individual interpretation of results  ( ) Discussion or coordination of care with other health care professionals  ( ) Ordering of unique tests, medications, or procedures  (X ) Documentation within the EHR       All questions and concerns were addressed to the patient/family. Alternatives to my treatment were discussed. The note was completed using EMR. Every effort was made to ensure accuracy; however, inadvertent computerized transcription errors may be present.     Zhane Mijares MD, [id number], [facility]  [phone] [facility] office  [phone] [facility]  9/15/2021  1:30 PM

## 2021-09-15 NOTE — LETTER
[place] [address]  Phone: [phone]  Fax: [phone]    Irma Gudino MD    September 15, 2021     Froilan Levy, [address] [place] [address]    Patient: Thomas Monroe   MR Number: <[id number]>   YOB: 1927   Date of Visit: 9/15/2021       Dear Froilan Levy:    Thank you for referring Kaci Bobby to me for evaluation/treatment. Below are the relevant portions of my assessment and plan of care. If you have questions, please do not hesitate to call me. I look forward to following Carrie Montero along with you.     Sincerely,      Irma Gudino MD

## 2021-09-15 NOTE — PATIENT INSTRUCTIONS
Plan:  1. Low Sodium Diet ~ 2 GM Daily ~ swelling to feet  2. Fluid Restriction 2 Liter per day ~ swelling to feet  3. May take Lasix 20 mg one extra tablet in the evenings as needed only for swelling ~ fluid retention  4. May proceed with dental work without any limitations. 5. Follow up in one year or sooner if needed.